# Patient Record
Sex: MALE | Race: BLACK OR AFRICAN AMERICAN | NOT HISPANIC OR LATINO | Employment: OTHER | URBAN - METROPOLITAN AREA
[De-identification: names, ages, dates, MRNs, and addresses within clinical notes are randomized per-mention and may not be internally consistent; named-entity substitution may affect disease eponyms.]

---

## 2024-01-02 ENCOUNTER — APPOINTMENT (OUTPATIENT)
Dept: PRIMARY CARE | Facility: CLINIC | Age: 74
End: 2024-01-02
Payer: MEDICARE

## 2024-01-05 ENCOUNTER — OFFICE VISIT (OUTPATIENT)
Dept: UROLOGY | Facility: CLINIC | Age: 74
End: 2024-01-05
Payer: MEDICARE

## 2024-01-05 VITALS — TEMPERATURE: 97.3 F

## 2024-01-05 DIAGNOSIS — N40.1 BPH WITH OBSTRUCTION/LOWER URINARY TRACT SYMPTOMS: Primary | ICD-10-CM

## 2024-01-05 DIAGNOSIS — N13.8 BPH WITH OBSTRUCTION/LOWER URINARY TRACT SYMPTOMS: Primary | ICD-10-CM

## 2024-01-05 DIAGNOSIS — N52.9 ERECTILE DYSFUNCTION, UNSPECIFIED ERECTILE DYSFUNCTION TYPE: ICD-10-CM

## 2024-01-05 PROCEDURE — 99214 OFFICE O/P EST MOD 30 MIN: CPT | Performed by: UROLOGY

## 2024-01-05 PROCEDURE — 1125F AMNT PAIN NOTED PAIN PRSNT: CPT | Performed by: UROLOGY

## 2024-01-05 PROCEDURE — 1159F MED LIST DOCD IN RCRD: CPT | Performed by: UROLOGY

## 2024-01-05 PROCEDURE — 51741 ELECTRO-UROFLOWMETRY FIRST: CPT | Performed by: UROLOGY

## 2024-01-05 PROCEDURE — 1036F TOBACCO NON-USER: CPT | Performed by: UROLOGY

## 2024-01-05 RX ORDER — TADALAFIL 20 MG/1
20 TABLET ORAL DAILY PRN
Qty: 30 TABLET | Refills: 6 | Status: SHIPPED | OUTPATIENT
Start: 2024-01-05 | End: 2025-01-04

## 2024-01-05 NOTE — PROGRESS NOTES
"Subjective   Claude E Yelder is a 73 y.o.  male with a history of erectile dysfunction and BPH s/p GreenLight PVP on 927/2021, presenting today for follow up visit. Patient reports no bothersome daytime urinary symptoms, his stream is excellent.  He has nocturia x3-4 with large volume. He does drink plenty of fluid before going to bed. Patient states that Sildenafil works well for ED, however he experiences heartburn when he takes the medication and is interested in trying an alternative. He denies flank pain, gross hematuria, kidney stones, and recurrent UTI.    Objective   Past Medical History:   Diagnosis Date    Personal history of other diseases of the musculoskeletal system and connective tissue 07/31/2018    History of radiculopathy     No past surgical history on file.  No current outpatient medications on file prior to visit.     No current facility-administered medications on file prior to visit.     No Known Allergies    Temp 36.3 °C (97.3 °F)   Physical Exam    Lab Review  No results found for: \"PSA\"  PVR 1ml      Assessment/Plan     Diagnoses and all orders for this visit:  Erectile dysfunction, unspecified erectile dysfunction type  -     tadalafil (Cialis) 20 mg tablet; Take 1 tablet (20 mg) by mouth once daily as needed for erectile dysfunction.  BPH with obstruction/lower urinary tract symptoms  -     Urine flow measurement    BPH with nocturia x3-4     We discussed behavioral modifications that can contribute to lessening irritative symptoms, namely evening fluid restriction, elevating one's legs for a while before bedtime, voiding right before bedtime, and avoiding bladder irritants like caffeine, carbonated beverages, dark-colored beverages, artificial sweeteners, acidic foods and beverages, and spicy foods.    We will continue to monitor and follow up in 1 year.     2. Erectile Dysfunction      We will start patient on Cialis 20mg.    The patient has been diagnosed with ED and cardiac assessment " according to the Wayne criteria allows use of oral PDE-5 inhibitor. The patient understands that these medications are not initiators of erection and that he will still require sexual stimulation. If prescribed vardenafil or sildenafil, he was advised to take the medication 30-60 minutes prior to sexual activity and that the efficacy of the medication is decreased following a high-fat meal.     The patient verbalized understanding that nitrates such as nitroglycerine, isosorbide mononitrate, isosorbide dinitrate and any other nitrate preparations are absolutely contradicted with the use of a PDE-5 inhibitor. The patient was advised to alert any medical personal of PDE- 5 inhibitor use should he seek urgent medical attention for any reason.     I explained the common adverse effects of therapy including but not limited to headache, flushing, dizziness, rash, upset stomach, diarrhea, nasal congestion, abnormal vision, back pain, and myalgia. Less common side effects are lightheadedness or blood pressure drop upon standing. We discussed that patients have  after using medications in this class, and sometimes the exact cause of death was not able to be determined. There is a very small risk of nonarteritic ischemic optic neuropathy, a rare cause of blindness that may be permanent while using medications in this class. Patient is instructed to immediately stop this medication and seek medical attention if he develops visual disturbances in one or both eyes.     We discussed that if he experiences erection lasting longer than four hours, he needs to seek immediate treatment at the ER as the longer he waits, the more damage is done to the penile tissue. The patient states that he is not withholding any information about his condition or the use of medications in order to receive a PDE5 inhibitor class medication. No barriers to learning were identified. After all of the patients' questions were satisfactorily  answered, he expressed understanding the risks of therapy and wishes to proceed.     All questions were answered to the patient's satisfaction. Patient agrees with the plan and wishes to proceed. Follow-up will be scheduled appropriately.     Scribed for Dr. Way by Kenna Rodriguez. I , Dr Way, have personally reviewed and agreed with the information entered by the Virtual Scribe.

## 2024-01-08 ENCOUNTER — APPOINTMENT (OUTPATIENT)
Dept: PRIMARY CARE | Facility: CLINIC | Age: 74
End: 2024-01-08
Payer: MEDICARE

## 2024-01-09 ENCOUNTER — OFFICE VISIT (OUTPATIENT)
Dept: PRIMARY CARE | Facility: CLINIC | Age: 74
End: 2024-01-09
Payer: MEDICARE

## 2024-01-09 VITALS
BODY MASS INDEX: 23.66 KG/M2 | WEIGHT: 169 LBS | DIASTOLIC BLOOD PRESSURE: 73 MMHG | SYSTOLIC BLOOD PRESSURE: 131 MMHG | HEIGHT: 71 IN | OXYGEN SATURATION: 97 % | HEART RATE: 70 BPM

## 2024-01-09 DIAGNOSIS — Z00.00 HEALTH CARE MAINTENANCE: ICD-10-CM

## 2024-01-09 DIAGNOSIS — I10 HYPERTENSION, UNSPECIFIED TYPE: ICD-10-CM

## 2024-01-09 DIAGNOSIS — K29.70 GASTRITIS WITHOUT BLEEDING, UNSPECIFIED CHRONICITY, UNSPECIFIED GASTRITIS TYPE: ICD-10-CM

## 2024-01-09 DIAGNOSIS — Z23 NEED FOR VACCINATION: ICD-10-CM

## 2024-01-09 DIAGNOSIS — Z00.00 ROUTINE GENERAL MEDICAL EXAMINATION AT HEALTH CARE FACILITY: Primary | ICD-10-CM

## 2024-01-09 DIAGNOSIS — Z12.5 SCREENING PSA (PROSTATE SPECIFIC ANTIGEN): ICD-10-CM

## 2024-01-09 PROBLEM — N13.8 BPH WITH OBSTRUCTION/LOWER URINARY TRACT SYMPTOMS: Status: ACTIVE | Noted: 2024-01-09

## 2024-01-09 PROBLEM — M54.16 ACUTE LUMBAR RADICULOPATHY: Status: ACTIVE | Noted: 2024-01-09

## 2024-01-09 PROBLEM — N40.1 BPH WITH OBSTRUCTION/LOWER URINARY TRACT SYMPTOMS: Status: ACTIVE | Noted: 2024-01-09

## 2024-01-09 LAB
ALBUMIN SERPL BCP-MCNC: 4.4 G/DL (ref 3.4–5)
ALP SERPL-CCNC: 104 U/L (ref 33–136)
ALT SERPL W P-5'-P-CCNC: 9 U/L (ref 10–52)
ANION GAP SERPL CALC-SCNC: 11 MMOL/L (ref 10–20)
AST SERPL W P-5'-P-CCNC: 15 U/L (ref 9–39)
BILIRUB SERPL-MCNC: 0.5 MG/DL (ref 0–1.2)
BUN SERPL-MCNC: 13 MG/DL (ref 6–23)
CALCIUM SERPL-MCNC: 10 MG/DL (ref 8.6–10.6)
CHLORIDE SERPL-SCNC: 105 MMOL/L (ref 98–107)
CHOLEST SERPL-MCNC: 186 MG/DL (ref 0–199)
CHOLESTEROL/HDL RATIO: 4.9
CO2 SERPL-SCNC: 31 MMOL/L (ref 21–32)
CREAT SERPL-MCNC: 1.18 MG/DL (ref 0.5–1.3)
EGFRCR SERPLBLD CKD-EPI 2021: 64 ML/MIN/1.73M*2
ERYTHROCYTE [DISTWIDTH] IN BLOOD BY AUTOMATED COUNT: 14.2 % (ref 11.5–14.5)
GLUCOSE SERPL-MCNC: 91 MG/DL (ref 74–99)
HCT VFR BLD AUTO: 45.2 % (ref 41–52)
HDLC SERPL-MCNC: 38 MG/DL
HGB BLD-MCNC: 14.8 G/DL (ref 13.5–17.5)
LDLC SERPL CALC-MCNC: 130 MG/DL
MCH RBC QN AUTO: 30 PG (ref 26–34)
MCHC RBC AUTO-ENTMCNC: 32.7 G/DL (ref 32–36)
MCV RBC AUTO: 92 FL (ref 80–100)
NON HDL CHOLESTEROL: 148 MG/DL (ref 0–149)
NRBC BLD-RTO: 0 /100 WBCS (ref 0–0)
PLATELET # BLD AUTO: 206 X10*3/UL (ref 150–450)
POTASSIUM SERPL-SCNC: 4.3 MMOL/L (ref 3.5–5.3)
PROT SERPL-MCNC: 7.2 G/DL (ref 6.4–8.2)
PSA SERPL-MCNC: 1.66 NG/ML
RBC # BLD AUTO: 4.94 X10*6/UL (ref 4.5–5.9)
SODIUM SERPL-SCNC: 143 MMOL/L (ref 136–145)
TRIGL SERPL-MCNC: 90 MG/DL (ref 0–149)
TSH SERPL-ACNC: 3.1 MIU/L (ref 0.44–3.98)
VLDL: 18 MG/DL (ref 0–40)
WBC # BLD AUTO: 9.5 X10*3/UL (ref 4.4–11.3)

## 2024-01-09 PROCEDURE — 3078F DIAST BP <80 MM HG: CPT | Performed by: INTERNAL MEDICINE

## 2024-01-09 PROCEDURE — 1036F TOBACCO NON-USER: CPT | Performed by: INTERNAL MEDICINE

## 2024-01-09 PROCEDURE — 80053 COMPREHEN METABOLIC PANEL: CPT

## 2024-01-09 PROCEDURE — 85027 COMPLETE CBC AUTOMATED: CPT

## 2024-01-09 PROCEDURE — 99397 PER PM REEVAL EST PAT 65+ YR: CPT | Performed by: INTERNAL MEDICINE

## 2024-01-09 PROCEDURE — 1125F AMNT PAIN NOTED PAIN PRSNT: CPT | Performed by: INTERNAL MEDICINE

## 2024-01-09 PROCEDURE — 3075F SYST BP GE 130 - 139MM HG: CPT | Performed by: INTERNAL MEDICINE

## 2024-01-09 PROCEDURE — 1160F RVW MEDS BY RX/DR IN RCRD: CPT | Performed by: INTERNAL MEDICINE

## 2024-01-09 PROCEDURE — 1170F FXNL STATUS ASSESSED: CPT | Performed by: INTERNAL MEDICINE

## 2024-01-09 PROCEDURE — G0439 PPPS, SUBSEQ VISIT: HCPCS | Performed by: INTERNAL MEDICINE

## 2024-01-09 PROCEDURE — G0103 PSA SCREENING: HCPCS

## 2024-01-09 PROCEDURE — 90677 PCV20 VACCINE IM: CPT | Performed by: INTERNAL MEDICINE

## 2024-01-09 PROCEDURE — 84443 ASSAY THYROID STIM HORMONE: CPT

## 2024-01-09 PROCEDURE — 1159F MED LIST DOCD IN RCRD: CPT | Performed by: INTERNAL MEDICINE

## 2024-01-09 PROCEDURE — 36415 COLL VENOUS BLD VENIPUNCTURE: CPT

## 2024-01-09 PROCEDURE — 80061 LIPID PANEL: CPT

## 2024-01-09 PROCEDURE — G0009 ADMIN PNEUMOCOCCAL VACCINE: HCPCS | Performed by: INTERNAL MEDICINE

## 2024-01-09 RX ORDER — OMEPRAZOLE 40 MG/1
40 CAPSULE, DELAYED RELEASE ORAL
Qty: 30 CAPSULE | Refills: 1 | Status: SHIPPED | OUTPATIENT
Start: 2024-01-09 | End: 2024-03-09

## 2024-01-09 ASSESSMENT — ACTIVITIES OF DAILY LIVING (ADL)
GROCERY_SHOPPING: INDEPENDENT
DRESSING: INDEPENDENT
MANAGING_FINANCES: INDEPENDENT
TAKING_MEDICATION: INDEPENDENT
DOING_HOUSEWORK: INDEPENDENT
BATHING: INDEPENDENT

## 2024-01-09 ASSESSMENT — PATIENT HEALTH QUESTIONNAIRE - PHQ9
SUM OF ALL RESPONSES TO PHQ9 QUESTIONS 1 AND 2: 0
1. LITTLE INTEREST OR PLEASURE IN DOING THINGS: NOT AT ALL
2. FEELING DOWN, DEPRESSED OR HOPELESS: NOT AT ALL

## 2024-01-09 NOTE — PROGRESS NOTES
"Subjective   Patient ID: Claude E Yelder is a 73 y.o. male who presents for Establish Care and Heartburn.    Patient Care Team:  Hadley Moses DO as PCP - General (Family Medicine)       HPI     Patient is a 43-year-old male with no significant past medical history presents for Medicare wellness  Patient is up-to-date on his colonoscopy.  He is due for laboratory screening.  Needs pneumonia vaccine today.  He has been complaining of epigastric pain it has been going on for the last month.  He has not tried any medications as of yet no blood in the stool no recent weight loss in fact he has gained some weight.  He does not take any medications and his blood pressure is well-controlled.      He lives alone at home.  He has 2 children who are grown.  Denies illicit substances smoking or alcohol    Review of Systems  Constitutional: No fever or chills, No Night Sweats  Eyes: No Blurry Vision or Eye sight problems  ENT: No Nasal Discharge, Hoarseness, sore throat  Cardiovascular: no chest pain, no palpitations and no syncope.   Respiratory: no cough, no shortness of breath during exertion and no shortness of breath at rest.   Gastrointestinal: no abdominal pain, no nausea and no vomiting.   : No issues with urinary stream, burning with urination, no blood in urine or stools  Skin: No Skin rashes or Lesions  Neuro: No Headache, no dizziness or Numbness or tingling  Psych: No Anxiety, depression or sleeping problems  Heme: No Easy bleeding or brusing.     Objective   /73   Pulse 70   Ht 1.803 m (5' 11\")   Wt 76.7 kg (169 lb)   SpO2 97%   BMI 23.57 kg/m²     Physical Exam  Constitutional: Alert and in no acute distress. Well developed, well nourished.   Head and Face: Head and face: Normal.    Eyes: Normal external exam. Pupils were equal in size, round, reactive to light (PERRL) with normal accommodation and extraocular movements intact (EOMI).   Ears, Nose, Mouth, and Throat: External inspection of ears and " nose: Normal.  Hearing: Normal.  Nasal mucosa, septum, and turbinates: Normal.  Lips, teeth, and gums: Normal.  Oropharynx: Normal.   Neck: No neck mass was observed. Supple. Thyroid not enlarged and there were no palpable thyroid nodules.   Cardiovascular: Heart rate and rhythm were normal, normal S1 and S2. Pedal pulses: Normal. No peripheral edema.   Pulmonary: No respiratory distress. Clear bilateral breath sounds.   Abdomen: Soft nontender; no abdominal mass palpated. Normal bowel sounds. No organomegaly.   : Deferred  Musculoskeletal: No joint swelling seen, normal movements of all extremities. Range of motion: Normal.  Muscle strength/tone: Normal.    Skin: Normal skin color and pigmentation, normal skin turgor, and no rash.   Neurologic: Deep tendon reflexes were 2+ and symmetric.   Psychiatric: Judgment and insight: Intact. Mood and affect: Normal.  Lymphatic: No cervical lymphadenopathy. Palpation of lymph nodes in axillae: Normal.  Palpation of lymph nodes in groin: Normal.    Lab Results   Component Value Date    WBC 7.1 09/22/2021    HGB 14.2 09/22/2021    HCT 43.0 09/22/2021     09/22/2021    CHOL 172 05/17/2021    TRIG 65 05/17/2021    HDL 38.2 (A) 05/17/2021    ALT 14 05/17/2021    AST 14 05/17/2021     09/22/2021    K 4.2 09/22/2021     09/22/2021    CREATININE 1.35 (H) 09/22/2021    BUN 8 09/22/2021    CO2 31 09/22/2021    INR 1.1 09/22/2021       Electrocardiogram 12 Lead  Normal sinus rhythm  Normal ECG  No previous ECGs available  Confirmed by ARIES CHOI MD [id number]) on 8/3/2020 7:49:49 AM  Electrocardiogram 12 Lead  Normal sinus rhythm  Normal ECG  When compared with ECG of 06-JUN-2020 13:44,  No significant change was found  Please see ED Provider Note for formal interpretation  Confirmed by KAMILA SERRANO MD ([id number]) on 1/22/2021 6:25:44 PM  Electrocardiogram 12 Lead  Please see ED Provider Note for formal interpretation  Confirmed by ZACH  KAMILA BECERRA ([id number]) on 1/22/2021 6:27:16 PM  Electrocardiogram 12 Lead  Normal sinus rhythm  Normal ECG  When compared with ECG of 15-YARELY-2021 16:58,  Previous ECG has undetermined rhythm, needs review  Confirmed by IVIS GAITAN MD [id number]) on 3/30/2021 8:03:02 PM      Assessment/Plan   Problem List Items Addressed This Visit    None  Visit Diagnoses         Codes    Routine general medical examination at health care facility    -  Primary Z00.00    Relevant Orders    1 Year Follow Up In [facility] - PCP - Wellness Exam    Health care maintenance     Z00.00    Relevant Orders    CBC    Comprehensive metabolic panel    Lipid Panel    Prostate Spec.Ag,Screen    TSH with reflex to Free T4 if abnormal    Screening PSA (prostate specific antigen)     Z12.5    Relevant Orders    Prostate Spec.Ag,Screen    Need for vaccination     Z23    Relevant Orders    Pneumococcal conjugate vaccine 20-valent IM    Gastritis without bleeding, unspecified chronicity, unspecified gastritis type     K29.70    Relevant Medications    omeprazole (PriLOSEC) 40 mg DR capsule    Hypertension, unspecified type     I10    Relevant Orders    CBC              Dear Claude E Yelder     It was my pleasure to take care of you today in the office. Below are the things we discussed today:    1. Immunizations: Yearly Flu shot is recommended.   Deferring vaccines but will allow for pneumonia 20    2. Blood Work: Ordered  3. Seen your dentist twice a year  4. Yearly Eye exam is recommended    5. BMI: 23.6  6: Diet recommendations:   Eat Clean, Try to have as many home cooked meals as possible  Avoid processed foods which contain excess calories, sugar, and sodium.    7. Exercise recommendations:   150 minutes a week to maintain your weight     If you have to loose weight, you need a better diet and exercise plan.     8. Please get your Living will / Advance directive completed if you do not have one already. Please make sure our office has  a copy of the latest one.     9. Colonoscopy: Uptodate  10. PSA: Ordered    11.     Follow up in one year for a Physical. Please call the office before your Physical to see if you need blood work completed prior to your physical.     Please call me if any questions arise from now until your next visit. I will call you after I am done seeing patients. A Doctor is always available by phone when the office is closed. Please feel free to call for help with any problem that you feel shouldn't wait until the office re-opens.     Shravan Rodriguez, DO

## 2024-05-28 ENCOUNTER — LAB (OUTPATIENT)
Dept: LAB | Facility: LAB | Age: 74
End: 2024-05-28
Payer: MEDICARE

## 2024-05-28 ENCOUNTER — OFFICE VISIT (OUTPATIENT)
Dept: PRIMARY CARE | Facility: CLINIC | Age: 74
End: 2024-05-28
Payer: MEDICARE

## 2024-05-28 VITALS
BODY MASS INDEX: 23.71 KG/M2 | OXYGEN SATURATION: 96 % | SYSTOLIC BLOOD PRESSURE: 125 MMHG | WEIGHT: 170 LBS | DIASTOLIC BLOOD PRESSURE: 76 MMHG | HEART RATE: 73 BPM

## 2024-05-28 DIAGNOSIS — R42 DIZZINESS: Primary | ICD-10-CM

## 2024-05-28 DIAGNOSIS — I10 HYPERTENSION, UNSPECIFIED TYPE: ICD-10-CM

## 2024-05-28 DIAGNOSIS — R42 DIZZINESS: ICD-10-CM

## 2024-05-28 PROBLEM — H81.10 BPPV (BENIGN PAROXYSMAL POSITIONAL VERTIGO): Status: ACTIVE | Noted: 2024-05-28

## 2024-05-28 LAB
ALBUMIN SERPL BCP-MCNC: 4.4 G/DL (ref 3.4–5)
ALP SERPL-CCNC: 106 U/L (ref 33–136)
ALT SERPL W P-5'-P-CCNC: 9 U/L (ref 10–52)
ANION GAP SERPL CALC-SCNC: 13 MMOL/L (ref 10–20)
AST SERPL W P-5'-P-CCNC: 13 U/L (ref 9–39)
BILIRUB SERPL-MCNC: 0.4 MG/DL (ref 0–1.2)
BUN SERPL-MCNC: 14 MG/DL (ref 6–23)
CALCIUM SERPL-MCNC: 9.9 MG/DL (ref 8.6–10.6)
CHLORIDE SERPL-SCNC: 105 MMOL/L (ref 98–107)
CO2 SERPL-SCNC: 29 MMOL/L (ref 21–32)
CREAT SERPL-MCNC: 1.19 MG/DL (ref 0.5–1.3)
EGFRCR SERPLBLD CKD-EPI 2021: 64 ML/MIN/1.73M*2
ERYTHROCYTE [DISTWIDTH] IN BLOOD BY AUTOMATED COUNT: 14.1 % (ref 11.5–14.5)
GLUCOSE SERPL-MCNC: 84 MG/DL (ref 74–99)
HCT VFR BLD AUTO: 47.3 % (ref 41–52)
HGB BLD-MCNC: 14.9 G/DL (ref 13.5–17.5)
MCH RBC QN AUTO: 28.9 PG (ref 26–34)
MCHC RBC AUTO-ENTMCNC: 31.5 G/DL (ref 32–36)
MCV RBC AUTO: 92 FL (ref 80–100)
NRBC BLD-RTO: 0 /100 WBCS (ref 0–0)
PLATELET # BLD AUTO: 200 X10*3/UL (ref 150–450)
POTASSIUM SERPL-SCNC: 4 MMOL/L (ref 3.5–5.3)
PROT SERPL-MCNC: 7.2 G/DL (ref 6.4–8.2)
RBC # BLD AUTO: 5.15 X10*6/UL (ref 4.5–5.9)
SODIUM SERPL-SCNC: 143 MMOL/L (ref 136–145)
TSH SERPL-ACNC: 1.71 MIU/L (ref 0.44–3.98)
WBC # BLD AUTO: 7 X10*3/UL (ref 4.4–11.3)

## 2024-05-28 PROCEDURE — 3078F DIAST BP <80 MM HG: CPT | Performed by: INTERNAL MEDICINE

## 2024-05-28 PROCEDURE — 3074F SYST BP LT 130 MM HG: CPT | Performed by: INTERNAL MEDICINE

## 2024-05-28 PROCEDURE — 1036F TOBACCO NON-USER: CPT | Performed by: INTERNAL MEDICINE

## 2024-05-28 PROCEDURE — 1159F MED LIST DOCD IN RCRD: CPT | Performed by: INTERNAL MEDICINE

## 2024-05-28 PROCEDURE — 36415 COLL VENOUS BLD VENIPUNCTURE: CPT

## 2024-05-28 PROCEDURE — 85027 COMPLETE CBC AUTOMATED: CPT

## 2024-05-28 PROCEDURE — 84443 ASSAY THYROID STIM HORMONE: CPT

## 2024-05-28 PROCEDURE — 99214 OFFICE O/P EST MOD 30 MIN: CPT | Performed by: INTERNAL MEDICINE

## 2024-05-28 PROCEDURE — 93000 ELECTROCARDIOGRAM COMPLETE: CPT | Performed by: INTERNAL MEDICINE

## 2024-05-28 PROCEDURE — 1123F ACP DISCUSS/DSCN MKR DOCD: CPT | Performed by: INTERNAL MEDICINE

## 2024-05-28 PROCEDURE — 1160F RVW MEDS BY RX/DR IN RCRD: CPT | Performed by: INTERNAL MEDICINE

## 2024-05-28 PROCEDURE — 80053 COMPREHEN METABOLIC PANEL: CPT

## 2024-05-28 RX ORDER — NAPROXEN SODIUM 220 MG
220 TABLET ORAL EVERY 12 HOURS
COMMUNITY

## 2024-05-28 RX ORDER — FLUOXETINE HYDROCHLORIDE 20 MG/1
20 CAPSULE ORAL DAILY
COMMUNITY
Start: 2021-04-01

## 2024-05-28 NOTE — PROGRESS NOTES
Subjective   Patient ID: Claude E Yelder is a 73 y.o. male who presents for Dizziness and Shoulder Pain.    HPI     Patient is a 73-year-old male with past medical history of BPH and lumbar radiculopathy who presents with chief complaint of ongoing dizziness when he stands up from a seated position.  Currently not experiencing any dizziness.  No shortness of breath.  No headaches.  No changes in vision.  He is not currently on any BPH medications or blood pressure medications although he does take Prozac once daily and follows with psychiatry.  No alleviating factors .  Symptoms occur when he gets up from a seated position and last for a brief period of time    Review of Systems  Constitutional: No fever or chills  Cardiovascular: no chest pain, no palpitations and no syncope.   Respiratory: no cough, no shortness of breath during exertion and no shortness of breath at rest.   Gastrointestinal: no abdominal pain, no nausea and no vomiting.  Neuro: No Headache, no dizziness    Objective   /76   Pulse 73   Wt 77.1 kg (170 lb)   SpO2 96%   BMI 23.71 kg/m²     Physical Exam  Constitutional: Alert and in no acute distress. Well developed, well nourished  Head and Face: Head and face: Normal.    Cardiovascular: Heart rate and rhythm were normal, normal S1 and S2. No peripheral edema.   Pulmonary: No respiratory distress. Clear bilateral breath sounds.  Musculoskeletal: Gait and station: Normal. Muscle strength/tone: Normal.   Skin: Normal skin color and pigmentation, normal skin turgor, and no rash.    Psychiatric: Judgment and insight: Intact. Mood and affect: Normal.    Procedures    Lab Results   Component Value Date    WBC 9.5 01/09/2024    HGB 14.8 01/09/2024    HCT 45.2 01/09/2024     01/09/2024    CHOL 186 01/09/2024    TRIG 90 01/09/2024    HDL 38.0 01/09/2024    ALT 9 (L) 01/09/2024    AST 15 01/09/2024     01/09/2024    K 4.3 01/09/2024     01/09/2024    CREATININE 1.18 01/09/2024     BUN 13 01/09/2024    CO2 31 01/09/2024    TSH 3.10 01/09/2024    INR 1.1 09/22/2021       Electrocardiogram 12 Lead  Normal sinus rhythm  Normal ECG  No previous ECGs available  Confirmed by ARIES CHOI MD ([id number]) on 8/3/2020 7:49:49 AM  Electrocardiogram 12 Lead  Normal sinus rhythm  Normal ECG  When compared with ECG of 06-JUN-2020 13:44,  No significant change was found  Please see ED Provider Note for formal interpretation  Confirmed by KAMILA SERRANO MD ([id number]) on 1/22/2021 6:25:44 PM  Electrocardiogram 12 Lead  Please see ED Provider Note for formal interpretation  Confirmed by KAMILA SERRANO MD ([id number]) on 1/22/2021 6:27:16 PM  Electrocardiogram 12 Lead  Normal sinus rhythm  Normal ECG  When compared with ECG of 15-YARELY-2021 16:58,  Previous ECG has undetermined rhythm, needs review  Confirmed by IVIS GAITAN MD ([id number]) on 3/30/2021 8:03:02 PM            Assessment/Plan

## 2024-05-28 NOTE — ASSESSMENT & PLAN NOTE
Probable positional vertigo.  Orthostatics were negative.  EKG showed normal sinus rhythm without significant abnormalities or arrhythmias.  Referral to vestibular therapy.  Check CT head.  Will check metabolic laboratory tests to rule out metabolic causes such as low sodium, kidney impairment and thyroid.  Follow-up 1 month for reevaluation    Orthostatic blood pressures include resting 157/77 with a heart rate of 60.  Sitting of 161/88 with a heart rate of 60.  Standing with a blood pressure of 158/98.

## 2024-06-26 ENCOUNTER — APPOINTMENT (OUTPATIENT)
Dept: PRIMARY CARE | Facility: CLINIC | Age: 74
End: 2024-06-26
Payer: MEDICARE

## 2024-06-26 VITALS
WEIGHT: 173 LBS | BODY MASS INDEX: 24.13 KG/M2 | OXYGEN SATURATION: 96 % | DIASTOLIC BLOOD PRESSURE: 76 MMHG | HEART RATE: 61 BPM | SYSTOLIC BLOOD PRESSURE: 161 MMHG

## 2024-06-26 DIAGNOSIS — M79.10 MUSCLE PAIN: Primary | ICD-10-CM

## 2024-06-26 DIAGNOSIS — R42 DIZZINESS: ICD-10-CM

## 2024-06-26 PROCEDURE — 1159F MED LIST DOCD IN RCRD: CPT | Performed by: INTERNAL MEDICINE

## 2024-06-26 PROCEDURE — 1123F ACP DISCUSS/DSCN MKR DOCD: CPT | Performed by: INTERNAL MEDICINE

## 2024-06-26 PROCEDURE — 99213 OFFICE O/P EST LOW 20 MIN: CPT | Performed by: INTERNAL MEDICINE

## 2024-06-26 PROCEDURE — 1036F TOBACCO NON-USER: CPT | Performed by: INTERNAL MEDICINE

## 2024-06-26 PROCEDURE — 1160F RVW MEDS BY RX/DR IN RCRD: CPT | Performed by: INTERNAL MEDICINE

## 2024-06-26 RX ORDER — METHOCARBAMOL 500 MG/1
500 TABLET, FILM COATED ORAL 3 TIMES DAILY PRN
Qty: 21 TABLET | Refills: 0 | Status: SHIPPED | OUTPATIENT
Start: 2024-06-26 | End: 2024-07-03

## 2024-06-26 NOTE — PROGRESS NOTES
Subjective   Patient ID: Claude E Yelder is a 73 y.o. male who presents for Follow-up and Hip Pain (Patient complaining of right sided hip pain radiating down rt leg).    HPI     Patient is a 73-year-old male who presents with chief complaint of right lower extremity pain.  Mostly affecting the greater trochanter on the right and radiating towards the knee.  It began after he was walking.  There is a time while in [place] at night for falls.  No alleviating factors.  Has not been using Tylenol or ibuprofen    Review of Systems  Constitutional: No fever or chills  Cardiovascular: no chest pain, no palpitations and no syncope.   Respiratory: no cough, no shortness of breath during exertion and no shortness of breath at rest.   Gastrointestinal: no abdominal pain, no nausea and no vomiting.  Neuro: No Headache, no dizziness    Objective   /76   Pulse 61   Wt 78.5 kg (173 lb)   SpO2 96%   BMI 24.13 kg/m²     Physical Exam  Constitutional: Alert and in no acute distress. Well developed, well nourished  Head and Face: Head and face: Normal.    Cardiovascular: Heart rate and rhythm were normal, normal S1 and S2. No peripheral edema.   Pulmonary: No respiratory distress. Clear bilateral breath sounds.  Musculoskeletal: Pain on the right worse with lifting the hip,   Skin: Normal skin color and pigmentation, normal skin turgor, and no rash.    Psychiatric: Judgment and insight: Intact. Mood and affect: Normal.    Procedures    Lab Results   Component Value Date    WBC 7.0 05/28/2024    HGB 14.9 05/28/2024    HCT 47.3 05/28/2024     05/28/2024    CHOL 186 01/09/2024    TRIG 90 01/09/2024    HDL 38.0 01/09/2024    ALT 9 (L) 05/28/2024    AST 13 05/28/2024     05/28/2024    K 4.0 05/28/2024     05/28/2024    CREATININE 1.19 05/28/2024    BUN 14 05/28/2024    CO2 29 05/28/2024    TSH 1.71 05/28/2024    INR 1.1 09/22/2021       ECG 12 lead (Clinic Performed)  Nsr without  arrythmia            Assessment/Plan   Problem List Items Addressed This Visit    None  Visit Diagnoses         Codes    Muscle pain    -  Primary M79.10    Relevant Medications    methocarbamol (Robaxin) 500 mg tablet    Dizziness     R42        patient with musculoskeletal pain likely due to overuse injury while at negative falls.  Will provide methocarbamol as needed for pain.  Recommend ibuprofen 400 mg twice daily as needed.  Encourage stretches and exercise.  Follow-up in 2 to 3 weeks if no improvement    Blood pressure likely elevated due to the pain.  In 2 weeks please recheck your blood pressure if it still above 140 please return to clinic for blood pressure recheck

## 2024-07-01 ENCOUNTER — APPOINTMENT (OUTPATIENT)
Dept: UROLOGY | Facility: CLINIC | Age: 74
End: 2024-07-01
Payer: MEDICARE

## 2024-07-01 DIAGNOSIS — N40.1 BPH WITH OBSTRUCTION/LOWER URINARY TRACT SYMPTOMS: ICD-10-CM

## 2024-07-01 DIAGNOSIS — N13.8 BPH WITH OBSTRUCTION/LOWER URINARY TRACT SYMPTOMS: ICD-10-CM

## 2024-07-01 DIAGNOSIS — N52.9 ERECTILE DYSFUNCTION, UNSPECIFIED ERECTILE DYSFUNCTION TYPE: Primary | ICD-10-CM

## 2024-07-01 PROCEDURE — 99214 OFFICE O/P EST MOD 30 MIN: CPT | Performed by: UROLOGY

## 2024-07-01 PROCEDURE — G2211 COMPLEX E/M VISIT ADD ON: HCPCS | Performed by: UROLOGY

## 2024-07-01 PROCEDURE — 1123F ACP DISCUSS/DSCN MKR DOCD: CPT | Performed by: UROLOGY

## 2024-07-01 RX ORDER — SILDENAFIL 100 MG/1
100 TABLET, FILM COATED ORAL AS NEEDED
Qty: 30 TABLET | Refills: 6 | Status: SHIPPED | OUTPATIENT
Start: 2024-07-01 | End: 2025-07-01

## 2024-07-01 NOTE — PROGRESS NOTES
Scribed for Dr. Ashley Way by Dominga Davis I, Dr. Ashley Way, have personally reviewed and agreed with the information entered by the Virtual Scribe. 07/01/24  This visit was completed via telemedicine. All issues as below were discussed and addressed but no physical exam was performed unless allowed by visual confirmation. If it was felt that the patient should be evaluated in clinic, then they were directed there. Patient verbal consented to the visit.    History of Present Illness (HPI):  TODAY (07/01/24)  Claude E Yelder is a 73 y.o. male presenting virtually with a history of erectile dysfunction and BPH s/p GreenLight PVP on 927/2021, ED managed with medication.     He reports that he would like to go back on the sildenafil as it was much more effective than the tadalafil we switched him to last visit.     Past Medical History:   Diagnosis Date    Personal history of other diseases of the musculoskeletal system and connective tissue 07/31/2018    History of radiculopathy     No past surgical history on file.  No family history on file.  Social History     Tobacco Use   Smoking Status Former    Types: Cigarettes   Smokeless Tobacco Never     Current Outpatient Medications   Medication Sig Dispense Refill    FLUoxetine (PROzac) 20 mg capsule Take 1 capsule (20 mg) by mouth once daily.      methocarbamol (Robaxin) 500 mg tablet Take 1 tablet (500 mg) by mouth 3 times a day as needed for muscle spasms for up to 7 days. 21 tablet 0    naproxen sodium (Aleve) 220 mg tablet Take 1 tablet (220 mg) by mouth every 12 hours.      omeprazole (PriLOSEC) 40 mg DR capsule Take 1 capsule (40 mg) by mouth once daily in the morning. Take before meals. Do not crush or chew. 30 capsule 1     No current facility-administered medications for this visit.     No Known Allergies  Past medical, surgical, family and social history in the chart was reviewed and is accurate including any additions to what is in this HPI.    Review  "of systems (ROS):   Pertinent information as listed in the HPI.     Objective   There were no vitals taken for this visit.  PHYSICAL EXAM:  Exam limited 2/2 telehealth visit.     Lab Review  Lab Results   Component Value Date    WBC 7.0 05/28/2024    RBC 5.15 05/28/2024    HGB 14.9 05/28/2024    HCT 47.3 05/28/2024     05/28/2024      Lab Results   Component Value Date    BUN 14 05/28/2024    CREATININE 1.19 05/28/2024      No results found for: \"PSA\", \"HGBA1C\"    Lab Results   Component Value Date    CHOL 186 01/09/2024    TRIG 90 01/09/2024    HDL 38.0 01/09/2024    ALT 9 (L) 05/28/2024    AST 13 05/28/2024     05/28/2024    K 4.0 05/28/2024     05/28/2024    CO2 29 05/28/2024    TSH 1.71 05/28/2024    INR 1.1 09/22/2021     ASSESSMENT:  Problem List Items Addressed This Visit       BPH with obstruction/lower urinary tract symptoms     Other Visit Diagnoses       Erectile dysfunction, unspecified erectile dysfunction type    -  Primary           PLAN:  BPH with LUTs stable  ED    Requested to change medications back to the Sildenafil 100 mg. This was sent to his pharmacy.    All questions were answered to the patient’s satisfaction.  Patient agrees with the plan and wishes to proceed.  Follow-up for ongoing care of his chronic medical conditions.  Follow-up will be scheduled appropriately.     Scribed for Dr. Ashley Way by Dominga Davis I, Dr. Ashley Way, have personally reviewed and agreed with the information entered by the Virtual Scribe. 07/01/24    "

## 2024-09-04 ENCOUNTER — OFFICE VISIT (OUTPATIENT)
Dept: ORTHOPEDIC SURGERY | Facility: CLINIC | Age: 74
End: 2024-09-04
Payer: MEDICARE

## 2024-09-04 ENCOUNTER — HOSPITAL ENCOUNTER (OUTPATIENT)
Dept: RADIOLOGY | Facility: CLINIC | Age: 74
Discharge: HOME | End: 2024-09-04
Payer: MEDICARE

## 2024-09-04 DIAGNOSIS — M54.16 ACUTE LUMBAR RADICULOPATHY: ICD-10-CM

## 2024-09-04 PROCEDURE — 1123F ACP DISCUSS/DSCN MKR DOCD: CPT

## 2024-09-04 PROCEDURE — 73502 X-RAY EXAM HIP UNI 2-3 VIEWS: CPT | Mod: RT

## 2024-09-04 PROCEDURE — 73502 X-RAY EXAM HIP UNI 2-3 VIEWS: CPT | Mod: RIGHT SIDE | Performed by: RADIOLOGY

## 2024-09-04 PROCEDURE — 99204 OFFICE O/P NEW MOD 45 MIN: CPT

## 2024-09-04 RX ORDER — METHYLPREDNISOLONE 4 MG/1
4 TABLET ORAL ONCE
Qty: 21 TABLET | Refills: 0 | Status: SHIPPED | OUTPATIENT
Start: 2024-09-04 | End: 2024-09-04

## 2024-09-04 NOTE — PROGRESS NOTES
Claude E Yelder  is a 73 y.o. year-old  male. he  is a new patient to our office and presents with a chief complaint of Right  hip pain.  The pain started 1 week ago. The pain's location is glut area and radiates posteriorly down the leg into the foot. Denies groin pain. He does endorse low back pain with numbness/tingling radiating down the leg into the foot. Denies red flag symptoms to include loss of bowel or bladder control. He notes a hx of low back pain which he has received corticosteroid injections in the past. The symptoms have been treated with rest and activity modification. NSAIDS including [IBU/Naproxen] have also been used with minimal improvement. They [have not] attempted physical therapy. They [have not] had a corticosteroid injection. They [have not] had a previous hip surgery.     Past Medical, Family, and Social History reviewed and inlcude:[none] all other history pertinent to the presenting problem  Review of Systems  A complete review of systems was conducted, pertinent only to the HPI noted above.  Constitutional: None  Eyes: No additions to above history  Ears, Nose, Throat: No additions to above history  Cardiovascular: No additions to above history  Respiratory: No additions to above history  GI: No additions to above history  : No additions to above history  Skin/Neuro: No additions to above history  Endocrine/Heme/Lymph: No additions to above history  Immunologic: No additions to above history  Psychiatric: No additions to above history  Musculoskeletal: see above  Eyes: sclera anicteric  ENT: hearing appropriate for normal conversation, neck appears symmetric with no gross thyromegaly  Pulm: No labored breathing, no wheezing  CVS: Regular rate and rhythm  Abd: Non-distended  Skin: No rashes, erythema, or induration around hip    MUSCULOSKELETAL EXAM: HIP      Right HIP:   IR@90: [40] degrees   ER@90: [70] degrees   No pain in groin with FADIR , no Pain with palpation over GT-  reproduces pain, -stinchfield, - subspine, pain and weakness with resisted hip abduction. Positive straight leg raise.     Neurovascularly Intact to Femroal/obturator/LFCN/S/S/SPN/DPN/T nerves on bilateral extremities    Xrays independently interpreted and  reviewed: femoral head well seated within acetabulum. Very mild DJD of the FA joint. Degenerative changes to the lumbar region. No fracture or dislocation.     1. Acute lumbar radiculopathy  XR hip right with pelvis when performed 2 or 3 views           PLAN:  Radiographs discussed with patient. Given his symptoms and clinical exam findings, these are more so consistent with a spinal pathology rather than a true hip pathology. I would like to get him set up with one of our spine providers for further follow up care. He was provided their office contact information. Offered to send him a medrol dose pack in the meantime, he would like to try this, this was sent. All questions answered, patient in agreement with plan.

## 2024-09-07 ENCOUNTER — APPOINTMENT (OUTPATIENT)
Dept: RADIOLOGY | Facility: HOSPITAL | Age: 74
End: 2024-09-07
Payer: MEDICARE

## 2024-09-07 ENCOUNTER — HOSPITAL ENCOUNTER (EMERGENCY)
Facility: HOSPITAL | Age: 74
Discharge: HOME | End: 2024-09-07
Attending: EMERGENCY MEDICINE
Payer: MEDICARE

## 2024-09-07 VITALS
HEIGHT: 71 IN | WEIGHT: 170 LBS | RESPIRATION RATE: 18 BRPM | HEART RATE: 57 BPM | OXYGEN SATURATION: 98 % | TEMPERATURE: 97.6 F | DIASTOLIC BLOOD PRESSURE: 86 MMHG | SYSTOLIC BLOOD PRESSURE: 210 MMHG | BODY MASS INDEX: 23.8 KG/M2

## 2024-09-07 DIAGNOSIS — M54.31 RIGHT SIDED SCIATICA: Primary | ICD-10-CM

## 2024-09-07 PROBLEM — H40.009 GLAUCOMA SUSPECT: Status: ACTIVE | Noted: 2024-09-07

## 2024-09-07 PROBLEM — H02.409 ACQUIRED PTOSIS OF EYELID: Status: ACTIVE | Noted: 2021-10-19

## 2024-09-07 PROBLEM — N41.9 PROSTATITIS: Status: ACTIVE | Noted: 2024-09-07

## 2024-09-07 PROBLEM — H04.129 TEAR FILM INSUFFICIENCY: Status: ACTIVE | Noted: 2024-09-07

## 2024-09-07 PROBLEM — N52.9 ERECTILE DYSFUNCTION: Status: ACTIVE | Noted: 2024-09-07

## 2024-09-07 PROBLEM — H15.122: Status: ACTIVE | Noted: 2024-09-07

## 2024-09-07 PROBLEM — H25.10 NUCLEAR SCLEROTIC CATARACT: Status: ACTIVE | Noted: 2024-09-07

## 2024-09-07 PROCEDURE — 2500000001 HC RX 250 WO HCPCS SELF ADMINISTERED DRUGS (ALT 637 FOR MEDICARE OP): Performed by: EMERGENCY MEDICINE

## 2024-09-07 PROCEDURE — 99284 EMERGENCY DEPT VISIT MOD MDM: CPT | Mod: 25

## 2024-09-07 PROCEDURE — 2500000004 HC RX 250 GENERAL PHARMACY W/ HCPCS (ALT 636 FOR OP/ED): Performed by: PHYSICIAN ASSISTANT

## 2024-09-07 PROCEDURE — 93971 EXTREMITY STUDY: CPT

## 2024-09-07 PROCEDURE — 2500000001 HC RX 250 WO HCPCS SELF ADMINISTERED DRUGS (ALT 637 FOR MEDICARE OP): Performed by: PHYSICIAN ASSISTANT

## 2024-09-07 PROCEDURE — 93971 EXTREMITY STUDY: CPT | Performed by: RADIOLOGY

## 2024-09-07 PROCEDURE — 96372 THER/PROPH/DIAG INJ SC/IM: CPT | Performed by: PHYSICIAN ASSISTANT

## 2024-09-07 RX ORDER — BACLOFEN 10 MG/1
10 TABLET ORAL 3 TIMES DAILY
Qty: 15 TABLET | Refills: 0 | Status: SHIPPED | OUTPATIENT
Start: 2024-09-07 | End: 2024-09-12

## 2024-09-07 RX ORDER — TRAMADOL HYDROCHLORIDE 50 MG/1
50 TABLET ORAL EVERY 6 HOURS PRN
Qty: 15 TABLET | Refills: 0 | Status: SHIPPED | OUTPATIENT
Start: 2024-09-07 | End: 2024-09-11

## 2024-09-07 RX ORDER — DIAZEPAM 5 MG/1
5 TABLET ORAL NIGHTLY PRN
Qty: 5 TABLET | Refills: 0 | Status: SHIPPED | OUTPATIENT
Start: 2024-09-07 | End: 2024-09-07

## 2024-09-07 RX ORDER — HYDROCHLOROTHIAZIDE 25 MG/1
25 TABLET ORAL DAILY
Qty: 20 TABLET | Refills: 0 | Status: SHIPPED | OUTPATIENT
Start: 2024-09-07 | End: 2024-09-27

## 2024-09-07 RX ORDER — DIAZEPAM 5 MG/1
5 TABLET ORAL NIGHTLY PRN
Qty: 5 TABLET | Refills: 0 | Status: SHIPPED | OUTPATIENT
Start: 2024-09-07 | End: 2024-09-12

## 2024-09-07 RX ORDER — TRAMADOL HYDROCHLORIDE 50 MG/1
50 TABLET ORAL EVERY 6 HOURS PRN
Qty: 15 TABLET | Refills: 0 | Status: SHIPPED | OUTPATIENT
Start: 2024-09-07 | End: 2024-09-07

## 2024-09-07 RX ORDER — HYDROCHLOROTHIAZIDE 25 MG/1
25 TABLET ORAL ONCE
Status: COMPLETED | OUTPATIENT
Start: 2024-09-07 | End: 2024-09-07

## 2024-09-07 RX ORDER — OXYCODONE AND ACETAMINOPHEN 5; 325 MG/1; MG/1
1 TABLET ORAL ONCE
Status: COMPLETED | OUTPATIENT
Start: 2024-09-07 | End: 2024-09-07

## 2024-09-07 RX ORDER — KETOROLAC TROMETHAMINE 30 MG/ML
15 INJECTION, SOLUTION INTRAMUSCULAR; INTRAVENOUS ONCE
Status: COMPLETED | OUTPATIENT
Start: 2024-09-07 | End: 2024-09-07

## 2024-09-07 RX ADMIN — OXYCODONE HYDROCHLORIDE AND ACETAMINOPHEN 1 TABLET: 5; 325 TABLET ORAL at 17:57

## 2024-09-07 RX ADMIN — HYDROCHLOROTHIAZIDE 25 MG: 25 TABLET ORAL at 21:34

## 2024-09-07 RX ADMIN — KETOROLAC TROMETHAMINE 15 MG: 30 INJECTION, SOLUTION INTRAMUSCULAR at 17:57

## 2024-09-07 ASSESSMENT — PAIN DESCRIPTION - ORIENTATION
ORIENTATION: RIGHT

## 2024-09-07 ASSESSMENT — COLUMBIA-SUICIDE SEVERITY RATING SCALE - C-SSRS
2. HAVE YOU ACTUALLY HAD ANY THOUGHTS OF KILLING YOURSELF?: NO
1. IN THE PAST MONTH, HAVE YOU WISHED YOU WERE DEAD OR WISHED YOU COULD GO TO SLEEP AND NOT WAKE UP?: NO
6. HAVE YOU EVER DONE ANYTHING, STARTED TO DO ANYTHING, OR PREPARED TO DO ANYTHING TO END YOUR LIFE?: NO

## 2024-09-07 ASSESSMENT — PAIN - FUNCTIONAL ASSESSMENT: PAIN_FUNCTIONAL_ASSESSMENT: 0-10

## 2024-09-07 ASSESSMENT — PAIN DESCRIPTION - LOCATION
LOCATION: LEG
LOCATION: HIP
LOCATION: LEG

## 2024-09-07 ASSESSMENT — PAIN DESCRIPTION - PAIN TYPE: TYPE: ACUTE PAIN

## 2024-09-07 ASSESSMENT — PAIN SCALES - GENERAL
PAINLEVEL_OUTOF10: 6
PAINLEVEL_OUTOF10: 10 - WORST POSSIBLE PAIN
PAINLEVEL_OUTOF10: 10 - WORST POSSIBLE PAIN

## 2024-09-07 ASSESSMENT — PAIN DESCRIPTION - ONSET: ONSET: GRADUAL

## 2024-09-07 ASSESSMENT — PAIN DESCRIPTION - PROGRESSION
CLINICAL_PROGRESSION: GRADUALLY WORSENING
CLINICAL_PROGRESSION: GRADUALLY IMPROVING

## 2024-09-07 ASSESSMENT — PAIN DESCRIPTION - DIRECTION: RADIATING_TOWARDS: RIGHT LEG

## 2024-09-07 ASSESSMENT — PAIN DESCRIPTION - FREQUENCY: FREQUENCY: CONSTANT/CONTINUOUS

## 2024-09-07 NOTE — ED TRIAGE NOTES
Pt came in for having right hip pain that goes down to his right leg. Pt woke up with the pain, Pt has been taking methylprednisolone. Pt can't bear weight on it without any pain.

## 2024-09-07 NOTE — ED PROVIDER NOTES
Chief Complaint   Patient presents with    Hip Pain     Right side    Leg Pain     Right side     HPI:   Claude E Yelder is an 73 y.o. male with history of BPH, MDD, GERD presents to the ED with spouse for evaluation of right hip pain.  Patient reports that pain began about 1 week ago.  He localizes it to the right buttock and states that it radiates down the back of his leg to just below his right knee. He reiterates that pain is not in his low back and points to his right mid buttock.  Rates pain 10/10.  It is worse with standing, sitting, sleeping.  Describes it as achy but when it shoots down his leg it is tingling.  Took Aleve earlier today with no relief.  Saw his orthopedist on Thursday for same complaint and was initiated on Medrol Dosepak for likely sciatica.  He said this is not working.  He denies trauma or injury. He denies any loss of bowel or bladder, urinary retention, saddle anesthesia, groin pain, dysuria, testicular pain or swelling, numbness, muscle weakness, fevers, night sweats, weight loss, chest pain or shortness of breath.  Patient denies ever having this problem before.  Has never had surgery on this joint.    Medications: Denies daily medications  Soc HX: medical marijuana, quit smoking 8 mos ago  No Known Allergies: NKDA  Past Medical History:   Diagnosis Date    Personal history of other diseases of the musculoskeletal system and connective tissue 07/31/2018    History of radiculopathy     History reviewed. No pertinent surgical history.  No family history on file.     Physical Exam  Vitals and nursing note reviewed.   Constitutional:       General: He is not in acute distress.     Appearance: He is not ill-appearing or toxic-appearing.      Comments: Appears uncomfortable.  Sitting in awkward position in wheelchair.   Eyes:      Pupils: Pupils are equal, round, and reactive to light.   Cardiovascular:      Rate and Rhythm: Normal rate and regular rhythm.      Pulses: Normal pulses.       Heart sounds: Normal heart sounds.   Pulmonary:      Effort: Pulmonary effort is normal.      Breath sounds: Normal breath sounds.   Abdominal:      General: Bowel sounds are normal.      Palpations: Abdomen is soft.      Tenderness: There is no abdominal tenderness. There is no right CVA tenderness, left CVA tenderness, guarding or rebound.   Musculoskeletal:        Back:       Comments: Limited range of motion of right hip, knee and ankle secondary to pain.  Right hip is without warmth, erythema.   Skin:     General: Skin is warm and dry.   Neurological:      Mental Status: He is alert.      Cranial Nerves: No cranial nerve deficit.      Sensory: No sensory deficit.      Gait: Gait abnormal (Unable to test gait secondary to pain.  Patient had difficulty moving from seated to standing position due to pain.).      Comments: No midline spinal tenderness.  Positive right-sided straight leg raise     VS: As documented in the triage note and EMR flowsheet from this visit were reviewed.    External Records Reviewed: I reviewed recent and relevant outside records including: Reviewed orthopedist note 9/4/2024.  Patient seen for right hip pain x 1 week.  X-ray imaging was ordered that showed a mild DJD of the FA joint.  Degenerative changes to the lumbar spine.  Referred to spine and given Medrol Dosepak.      Medical Decision Making:   ED Course as of 09/08/24 1548   Sat Sep 07, 2024   1734 Vitals Reviewed: Afebrile. Hypertensive. Not tachycardic nor tachypneic. No hypoxia.   [KA]   1758 Patient is 73-year-old male who presents to the ED for evaluation of right buttock/hip pain.  He appears uncomfortable.  He has positive right-sided straight leg raise.  Will not allow me to assess range of motion of the right hip, ankle or knee secondary to pain.  Had difficulty standing up out of the wheelchair due to pain.  Inspected the hip joint.  There is no warmth, erythema, edema and I do not suspect septic arthritis.  Do not feel  he necessitates blood work.  Suspect sciatica versus piriformis syndrome.  I reviewed his x-ray from 3 days ago which showed mild DJD no other bony abnormalities.  Do not feel he necessitates repeat exposure to radiation.  He is requesting MRI, advised patient we do not perform MRIs of the hip in the ER.  He denies any urinary symptoms, UA not indicated.  Because he is a bounce back, will be staffed with my attending.  Patient to receive IM Toradol and p.o. Percocet for pain. [KA]   1916 Attending evaluated patient and recommends obtaining duplex ultrasound for subtle right calf swelling.  Patient did quit smoking 8 months ago and if there is any underlying malignancy could make him more hypercoagulable.  I have placed order for RLE duplex.  Patient be signed out to attending pending imaging [KA]      ED Course User Index  [KA] Shanelle Urbina PA-C         Diagnoses as of 09/08/24 1548   Right sided sciatica      Escalation of Care: Appropriate for outpatient management       Discussion of Management with Other Providers:  I discussed the patient/results with: Attending Lj    Counseling: Spoke with the patient and discussed today´s findings, in addition to providing specific details for the plan of care and expected course.  Patient was given the opportunity to ask questions.    Discussed return precautions and importance of follow-up.  Advised to follow-up with orthopedic and/or PCP.  Advised to return to the ED for changing or worsening symptoms, new symptoms, complaint specific precautions, and precautions listed on the discharge paperwork.  Educated on the common potential side effects of medications prescribed.    I advised the patient that the emergency evaluation and treatment provided today doesn't end their need for medical care. It is very important that they follow-up with their primary care provider or other specialist as instructed.    The plan of care was mutually agreed upon with the patient.  The patient and/or family were given the opportunity to ask questions. All questions asked today in the ED were answered to the best of my ability with today's information.    I specifically advised the patient to return to the ED for changing or worsening symptoms, worrisome new symptoms, or for any complaint specific precautions listed on the discharge paperwork.    This patient was cared for in the setting of nationwide stress on resources and staffing.    This report was transcribed using voice recognition software.  Every effort was made to ensure accuracy, however, inadvertently computerized transcription errors may be present.       Shanelle Urbina PA-C  09/08/24 1547

## 2024-09-16 ENCOUNTER — APPOINTMENT (OUTPATIENT)
Dept: ORTHOPEDIC SURGERY | Facility: CLINIC | Age: 74
End: 2024-09-16
Payer: MEDICARE

## 2024-09-17 ENCOUNTER — APPOINTMENT (OUTPATIENT)
Dept: ORTHOPEDIC SURGERY | Facility: CLINIC | Age: 74
End: 2024-09-17
Payer: MEDICARE

## 2024-09-17 ENCOUNTER — HOSPITAL ENCOUNTER (OUTPATIENT)
Dept: RADIOLOGY | Facility: CLINIC | Age: 74
Discharge: HOME | End: 2024-09-17
Payer: MEDICARE

## 2024-09-17 DIAGNOSIS — M41.9 SCOLIOSIS OF LUMBAR SPINE, UNSPECIFIED SCOLIOSIS TYPE: ICD-10-CM

## 2024-09-17 DIAGNOSIS — M47.816 LUMBAR SPONDYLOSIS: Primary | ICD-10-CM

## 2024-09-17 DIAGNOSIS — M47.816 LUMBAR SPONDYLOSIS: ICD-10-CM

## 2024-09-17 DIAGNOSIS — M48.062 SPINAL STENOSIS OF LUMBAR REGION WITH NEUROGENIC CLAUDICATION: ICD-10-CM

## 2024-09-17 DIAGNOSIS — M54.16 LUMBAR RADICULOPATHY: ICD-10-CM

## 2024-09-17 DIAGNOSIS — F41.1 ANXIETY STATE: ICD-10-CM

## 2024-09-17 PROCEDURE — 1123F ACP DISCUSS/DSCN MKR DOCD: CPT | Performed by: PHYSICIAN ASSISTANT

## 2024-09-17 PROCEDURE — 72120 X-RAY BEND ONLY L-S SPINE: CPT

## 2024-09-17 PROCEDURE — 1159F MED LIST DOCD IN RCRD: CPT | Performed by: PHYSICIAN ASSISTANT

## 2024-09-17 PROCEDURE — 99215 OFFICE O/P EST HI 40 MIN: CPT | Performed by: PHYSICIAN ASSISTANT

## 2024-09-17 PROCEDURE — 1036F TOBACCO NON-USER: CPT | Performed by: PHYSICIAN ASSISTANT

## 2024-09-17 PROCEDURE — 1125F AMNT PAIN NOTED PAIN PRSNT: CPT | Performed by: PHYSICIAN ASSISTANT

## 2024-09-17 RX ORDER — DIAZEPAM 10 MG/1
TABLET ORAL
Qty: 1 TABLET | Refills: 0 | Status: SHIPPED | OUTPATIENT
Start: 2024-09-17

## 2024-09-17 RX ORDER — PREDNISONE 10 MG/1
TABLET ORAL
Qty: 24 TABLET | Refills: 0 | Status: SHIPPED | OUTPATIENT
Start: 2024-09-17 | End: 2024-09-23

## 2024-09-17 RX ORDER — GABAPENTIN 300 MG/1
300 CAPSULE ORAL 2 TIMES DAILY
Qty: 60 CAPSULE | Refills: 2 | Status: SHIPPED | OUTPATIENT
Start: 2024-09-17 | End: 2024-12-16

## 2024-09-17 ASSESSMENT — PAIN - FUNCTIONAL ASSESSMENT: PAIN_FUNCTIONAL_ASSESSMENT: 0-10

## 2024-09-17 ASSESSMENT — PAIN SCALES - GENERAL: PAINLEVEL_OUTOF10: 9
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
## 2024-09-23 ENCOUNTER — APPOINTMENT (OUTPATIENT)
Dept: PRIMARY CARE | Facility: CLINIC | Age: 74
End: 2024-09-23
Payer: MEDICARE

## 2024-10-02 ENCOUNTER — HOSPITAL ENCOUNTER (OUTPATIENT)
Dept: RADIOLOGY | Facility: HOSPITAL | Age: 74
Discharge: HOME | End: 2024-10-02
Payer: MEDICARE

## 2024-10-02 DIAGNOSIS — M54.16 LUMBAR RADICULOPATHY: ICD-10-CM

## 2024-10-02 PROCEDURE — 72148 MRI LUMBAR SPINE W/O DYE: CPT | Performed by: RADIOLOGY

## 2024-10-02 PROCEDURE — 72148 MRI LUMBAR SPINE W/O DYE: CPT

## 2024-10-03 ENCOUNTER — OFFICE VISIT (OUTPATIENT)
Dept: PAIN MEDICINE | Facility: HOSPITAL | Age: 74
End: 2024-10-03
Payer: MEDICARE

## 2024-10-03 DIAGNOSIS — M48.062 SPINAL STENOSIS OF LUMBAR REGION WITH NEUROGENIC CLAUDICATION: Primary | ICD-10-CM

## 2024-10-03 DIAGNOSIS — M54.16 ACUTE LUMBAR RADICULOPATHY: ICD-10-CM

## 2024-10-03 PROCEDURE — 99214 OFFICE O/P EST MOD 30 MIN: CPT | Performed by: ANESTHESIOLOGY

## 2024-10-03 PROCEDURE — 1125F AMNT PAIN NOTED PAIN PRSNT: CPT | Performed by: ANESTHESIOLOGY

## 2024-10-03 PROCEDURE — 1123F ACP DISCUSS/DSCN MKR DOCD: CPT | Performed by: ANESTHESIOLOGY

## 2024-10-03 PROCEDURE — 1159F MED LIST DOCD IN RCRD: CPT | Performed by: ANESTHESIOLOGY

## 2024-10-03 RX ORDER — TRAMADOL HYDROCHLORIDE 50 MG/1
50 TABLET ORAL 3 TIMES DAILY PRN
Qty: 21 TABLET | Refills: 0 | Status: SHIPPED | OUTPATIENT
Start: 2024-10-03 | End: 2024-10-10

## 2024-10-03 ASSESSMENT — PAIN SCALES - GENERAL: PAINLEVEL: 7

## 2024-10-03 NOTE — PROGRESS NOTES
Chief Complaint   Patient presents with    Back Pain    Extremity Pain      HPI   Mr. Rajan is a 73-year-old male with a history of back pain.  The patient previously has been seen low not for 3 years or so.  He did have a known history of spinal stenosis most significant at the L4-5 level and last had a bilateral L4 transforaminal in November 2021.  The patient since his last epidural in 2021 he had no pain until 1 month ago.  Pain started abruptly without inciting event.  He says that this has been the worst pain ever.  He says that the pain is so severe he is crawling out of bed and crawling around his home.  He has pain that is across the low back and radiates into the right sided low back, buttock, and leg into the shin and calf.  He says the pain is burning and sharp.  He denies any comfortable position.  He says that he has been taking pills to get through.  He was seen in the emergency room as well since this started on 2 occasions.  ER doctor gave him benzodiazepines and some tramadol.  He was also given gabapentin.  The only medication he has left is gabapentin which he takes 600 mg twice daily.  He is also taking Aleve and reports taking up to 6 tabs of over-the-counter Aleve a day.  Pain is an 8-10 out of 10 at all times.  He did get an MRI which revealed ongoing stenosis at L4-5.  Additionally since onset of his pain he has been evaluated by orthopedic spine surgery.    ROS: 13 point review of systems is complete and is negative listed above in HPI    Past Medical History:   Diagnosis Date    Personal history of other diseases of the musculoskeletal system and connective tissue 07/31/2018    History of radiculopathy       No past surgical history on file.    No family history on file.    Social History     Tobacco Use    Smoking status: Former     Types: Cigarettes    Smokeless tobacco: Never       Current Outpatient Medications on File Prior to Visit   Medication Sig Dispense Refill    baclofen  (Lioresal) 10 mg tablet Take 1 tablet (10 mg) by mouth 3 times a day for 5 days. As needed for muscle spasm 15 tablet 0    diazePAM (Valium) 10 mg tablet Take 1 tab po 45min prior to MRI. You will need a  day of procedure 1 tablet 0    diazePAM (Valium) 5 mg tablet Take 1 tablet (5 mg) by mouth as needed at bedtime for anxiety or muscle spasms for up to 5 days. 5 tablet 0    FLUoxetine (PROzac) 20 mg capsule Take 1 capsule (20 mg) by mouth once daily.      gabapentin (Neurontin) 300 mg capsule Take 1 capsule (300 mg) by mouth 2 times a day. 60 capsule 2    hydroCHLOROthiazide (HYDRODiuril) 25 mg tablet Take 1 tablet (25 mg) by mouth once daily for 20 days. 20 tablet 0    methocarbamol (Robaxin) 500 mg tablet Take 1 tablet (500 mg) by mouth 3 times a day as needed for muscle spasms for up to 7 days. 21 tablet 0    naproxen sodium (Aleve) 220 mg tablet Take 1 tablet (220 mg) by mouth every 12 hours.      omeprazole (PriLOSEC) 40 mg DR capsule Take 1 capsule (40 mg) by mouth once daily in the morning. Take before meals. Do not crush or chew. 30 capsule 1    sildenafil (Viagra) 100 mg tablet Take 1 tablet (100 mg) by mouth if needed for erectile dysfunction. 30 tablet 6     No current facility-administered medications on file prior to visit.        No Known Allergies       Imaging:  Narrative & Impression   Interpreted By:  Bernardo Gomez,   STUDY:  MR LUMBAR SPINE WO IV CONTRAST;  10/2/2024 1:39 pm      INDICATION:  Signs/Symptoms:pain.      COMPARISON:  11/01/2021      ACCESSION NUMBER(S):  [id number]      ORDERING CLINICIAN:  IVANIA DAVIS      TECHNIQUE:  Sagittal STIR, sagittal T2, sagittal T1, axial T2, axial T1 weighted  MRI images of the lumbar spine were obtained without intravenous  contrast administration.      FINDINGS:  The coronal  images again demonstrate a levocurvature of the  lumbar spine.      There are degenerative signal changes noted along endplates within  lumbar region most  pronounced at the L3/4 and L4/5 levels.      There is a 11 mm hemangioma within the L2 vertebral body.      The visualized spinal cord demonstrates no signal abnormality within  it.  The conus medullaris is normally positioned terminating at the  L1/2 level.      There is again evidence of a small linear focus of fat signal along  the filum terminale compatible with a fatty filum.      At the L5/S1 level,  there is mild posterior osteophytic spurring  posterior disc bulge along with degenerative facet changes and  ligamentum flavum hypertrophy contributing to mild spinal canal  narrowing. There is moderate encroachment upon the neural foramen  bilaterally left greater than right.      At the L4/L5 level,  there is posterior osteophytic spurring, a  posterior disc bulge along with hypertrophic degenerative facet  changes and ligamentum flavum hypertrophy contributing to severe  narrowing of the thecal sac in the AP dimension within the spinal  canal. There is severe left and moderate to severe right-sided neural  foraminal narrowing.      At the L3/L4 level,  there is posterior osteophytic spurring and a  posterior disc bulge along with degenerative facet changes. There is  encroachment upon the lateral recesses right greater than left. There  is mild overall narrowing of the thecal sac within the spinal canal.  There is moderate right and mild-to-moderate left-sided neural  foraminal narrowing      At the L2/L3 level,  there is posterior osteophytic spurring and  posterior disc bulge along with degenerative facet changes  contributing to mild-to-moderate spinal canal narrowing. There is  moderate right and mild left-sided neural foraminal narrowing.      At the L1/L2 level,  there is posterior osteophytic spurring and  posterior disc bulge asymmetrically more pronounced to the left of  midline along with degenerative facet changes. There is mild overall  narrowing of the thecal sac within the spinal canal there is  moderate  left and mild right-sided neural foraminal narrowing      At the T12/L1 level,  there are degenerative facet changes without  significant spinal canal or neural foraminal narrowing.      At the T11/12 level, there are degenerative facet changes without  significant spinal canal contributing to mild encroachment upon the  neural foramen left greater than right. There is no significant  spinal canal narrowing.      IMPRESSION:  The coronal  images again demonstrate a levocurvature of the  lumbar spine.      There is multilevel spondylosis with varying degrees of spinal canal  and neural foraminal narrowing as described above.           Physical Exam:  Gen.: Pleasant, appears stated age  Eyes: Pupils are symmetric  ENT: Hearing is grossly intact  Neck: No JVD noted, tracheal position is midline  Respiratory: No gasping or shortness of breath noted, no use of accessory muscles noted  Cardiovascular: Extremity show no edema   Skin: No rashes or open lesions or ulcers identified on the skin  Musculoskeletal: Positive straight leg raise on right side only, intact strength and sensation bilateral lower extremities outside of some slight weakness with dorsiflexion on the right side which may be limited by pain.  Normal reflex.  Neurologic: Cranial nerves II through XII are grossly intact  Psychiatric:  Patient is alert and oriented x3    Impression/Plan:  73-year-old male with a history of low back and right leg pain who has known spinal stenosis which is severe in nature.  His MRI is not significantly changed from prior and he responded well with 3 years of relief after his last transforaminal.  He would like to pursue a repeat injection.    -Continue gabapentin 600 twice daily.  Side effect profile and risk reviewed.  If this medication is stopped later would need to be weaned down rather than stop abruptly.    -Reviewed his OARRS.  Patient has not had marijuana prescriptions since April.  He did have a  recent prescription for tramadol and a benzo that was given by the ER physician which he is no longer using, prescription ran out.  Will give a short course of tramadol to be taken sparingly in between now and his injection.  Patient reports severe and unrelenting pain that is not well-managed otherwise.  Risks associated with medication reviewed.  Advised not to take concurrently with other medications outside of things he is currently prescribed.  Counseled on Narcan use.    -Will plan for repeat bilateral L4 transforaminal.  Procedure, risk, benefits, alternatives reviewed.

## 2024-10-08 ENCOUNTER — APPOINTMENT (OUTPATIENT)
Dept: PAIN MEDICINE | Facility: HOSPITAL | Age: 74
End: 2024-10-08
Payer: MEDICARE

## 2024-10-18 ENCOUNTER — HOSPITAL ENCOUNTER (OUTPATIENT)
Facility: HOSPITAL | Age: 74
Discharge: HOME | End: 2024-10-18
Payer: MEDICARE

## 2024-10-18 VITALS
DIASTOLIC BLOOD PRESSURE: 76 MMHG | HEART RATE: 63 BPM | OXYGEN SATURATION: 100 % | HEIGHT: 71 IN | BODY MASS INDEX: 23.52 KG/M2 | SYSTOLIC BLOOD PRESSURE: 134 MMHG | WEIGHT: 168 LBS | TEMPERATURE: 98.2 F | RESPIRATION RATE: 18 BRPM

## 2024-10-18 DIAGNOSIS — M54.16 ACUTE LUMBAR RADICULOPATHY: ICD-10-CM

## 2024-10-18 PROCEDURE — 2500000004 HC RX 250 GENERAL PHARMACY W/ HCPCS (ALT 636 FOR OP/ED): Performed by: ANESTHESIOLOGY

## 2024-10-18 PROCEDURE — 64483 NJX AA&/STRD TFRM EPI L/S 1: CPT | Mod: 50 | Performed by: ANESTHESIOLOGY

## 2024-10-18 PROCEDURE — 3700000012 HC SEDATION LEVEL 5+ TIME - INITIAL 15 MINUTES 5/> YEARS

## 2024-10-18 PROCEDURE — 2720000007 HC OR 272 NO HCPCS

## 2024-10-18 PROCEDURE — 64483 NJX AA&/STRD TFRM EPI L/S 1: CPT | Performed by: ANESTHESIOLOGY

## 2024-10-18 PROCEDURE — 2550000001 HC RX 255 CONTRASTS: Performed by: ANESTHESIOLOGY

## 2024-10-18 RX ORDER — LIDOCAINE HYDROCHLORIDE 5 MG/ML
INJECTION, SOLUTION INFILTRATION; INTRAVENOUS
Status: COMPLETED | OUTPATIENT
Start: 2024-10-18 | End: 2024-10-18

## 2024-10-18 RX ORDER — MIDAZOLAM HYDROCHLORIDE 1 MG/ML
INJECTION, SOLUTION INTRAMUSCULAR; INTRAVENOUS
Status: COMPLETED | OUTPATIENT
Start: 2024-10-18 | End: 2024-10-18

## 2024-10-18 RX ORDER — DEXAMETHASONE SODIUM PHOSPHATE 10 MG/ML
INJECTION INTRAMUSCULAR; INTRAVENOUS
Status: DISCONTINUED
Start: 2024-10-18 | End: 2024-10-19 | Stop reason: HOSPADM

## 2024-10-18 RX ORDER — MIDAZOLAM HYDROCHLORIDE 1 MG/ML
INJECTION INTRAMUSCULAR; INTRAVENOUS
Status: DISCONTINUED
Start: 2024-10-18 | End: 2024-10-19 | Stop reason: HOSPADM

## 2024-10-18 RX ORDER — LIDOCAINE HYDROCHLORIDE 5 MG/ML
INJECTION, SOLUTION INFILTRATION; INTRAVENOUS
Status: DISCONTINUED
Start: 2024-10-18 | End: 2024-10-19 | Stop reason: HOSPADM

## 2024-10-18 RX ORDER — DEXAMETHASONE SODIUM PHOSPHATE 10 MG/ML
INJECTION INTRAMUSCULAR; INTRAVENOUS
Status: COMPLETED | OUTPATIENT
Start: 2024-10-18 | End: 2024-10-18

## 2024-10-18 ASSESSMENT — PAIN - FUNCTIONAL ASSESSMENT
PAIN_FUNCTIONAL_ASSESSMENT: 0-10

## 2024-10-18 ASSESSMENT — PAIN SCALES - GENERAL
PAINLEVEL_OUTOF10: 10 - WORST POSSIBLE PAIN

## 2024-10-18 ASSESSMENT — COLUMBIA-SUICIDE SEVERITY RATING SCALE - C-SSRS
6. HAVE YOU EVER DONE ANYTHING, STARTED TO DO ANYTHING, OR PREPARED TO DO ANYTHING TO END YOUR LIFE?: NO
1. IN THE PAST MONTH, HAVE YOU WISHED YOU WERE DEAD OR WISHED YOU COULD GO TO SLEEP AND NOT WAKE UP?: NO
2. HAVE YOU ACTUALLY HAD ANY THOUGHTS OF KILLING YOURSELF?: NO

## 2024-10-18 NOTE — H&P
HISTORY AND PHYSICAL    History Of Present Illness  Claude E Yelder is a 73 y.o. male presenting with chronic pain here for procedure as stated below. Patient denies any changes to health since the last visit to our clinic.    Past Medical History  Past Medical History:   Diagnosis Date    Personal history of other diseases of the musculoskeletal system and connective tissue 07/31/2018    History of radiculopathy       Surgical History  No past surgical history on file.     Social History  He reports that he has quit smoking. His smoking use included cigarettes. He has never used smokeless tobacco. No history on file for alcohol use and drug use.    Family History  No family history on file.     Allergies  Patient has no known allergies.    Review of Systems  12 point ROS done and negative except for the above.     Physical Exam  General: NAD, well groomed, well nourished  Eyes: Non-icteric sclera, EOMI  Ears, Nose, Mouth, and Throat: External ears and nose appear to be without deformity or rash. No lesions or masses noted. Hearing is grossly intact.   Neck: Trachea midline  Respiratory: Nonlabored breathing   Cardiovascular: No peripheral edema   Skin: No rashes or open lesions/ulcers identified on skin.      Last Recorded Vitals  There were no vitals taken for this visit.    Relevant Results  Current Outpatient Medications   Medication Instructions    baclofen (LIORESAL) 10 mg, oral, 3 times daily, As needed for muscle spasm    diazePAM (Valium) 10 mg tablet Take 1 tab po 45min prior to MRI. You will need a  day of procedure    diazePAM (VALIUM) 5 mg, oral, Nightly PRN    FLUoxetine (PROZAC) 20 mg, oral, Daily    gabapentin (NEURONTIN) 300 mg, oral, 2 times daily    hydroCHLOROthiazide (HYDRODIURIL) 25 mg, oral, Daily    methocarbamol (ROBAXIN) 500 mg, oral, 3 times daily PRN    naproxen sodium (ALEVE) 220 mg, oral, Every 12 hours    omeprazole (PRILOSEC) 40 mg, oral, Daily before breakfast, Do not crush or  chew.    sildenafil (VIAGRA) 100 mg, oral, As needed         MR lumbar spine wo IV contrast 10/02/2024    Narrative  Interpreted By:  Bernardo Gomez,  STUDY:  MR LUMBAR SPINE WO IV CONTRAST;  10/2/2024 1:39 pm    INDICATION:  Signs/Symptoms:pain.    COMPARISON:  11/01/2021    ACCESSION NUMBER(S):  [id number]    ORDERING CLINICIAN:  IVANIA DAVIS    TECHNIQUE:  Sagittal STIR, sagittal T2, sagittal T1, axial T2, axial T1 weighted  MRI images of the lumbar spine were obtained without intravenous  contrast administration.    FINDINGS:  The coronal  images again demonstrate a levocurvature of the  lumbar spine.    There are degenerative signal changes noted along endplates within  lumbar region most pronounced at the L3/4 and L4/5 levels.    There is a 11 mm hemangioma within the L2 vertebral body.    The visualized spinal cord demonstrates no signal abnormality within  it.  The conus medullaris is normally positioned terminating at the  L1/2 level.    There is again evidence of a small linear focus of fat signal along  the filum terminale compatible with a fatty filum.    At the L5/S1 level,  there is mild posterior osteophytic spurring  posterior disc bulge along with degenerative facet changes and  ligamentum flavum hypertrophy contributing to mild spinal canal  narrowing. There is moderate encroachment upon the neural foramen  bilaterally left greater than right.    At the L4/L5 level,  there is posterior osteophytic spurring, a  posterior disc bulge along with hypertrophic degenerative facet  changes and ligamentum flavum hypertrophy contributing to severe  narrowing of the thecal sac in the AP dimension within the spinal  canal. There is severe left and moderate to severe right-sided neural  foraminal narrowing.    At the L3/L4 level,  there is posterior osteophytic spurring and a  posterior disc bulge along with degenerative facet changes. There is  encroachment upon the lateral recesses right greater  than left. There  is mild overall narrowing of the thecal sac within the spinal canal.  There is moderate right and mild-to-moderate left-sided neural  foraminal narrowing    At the L2/L3 level,  there is posterior osteophytic spurring and  posterior disc bulge along with degenerative facet changes  contributing to mild-to-moderate spinal canal narrowing. There is  moderate right and mild left-sided neural foraminal narrowing.    At the L1/L2 level,  there is posterior osteophytic spurring and  posterior disc bulge asymmetrically more pronounced to the left of  midline along with degenerative facet changes. There is mild overall  narrowing of the thecal sac within the spinal canal there is moderate  left and mild right-sided neural foraminal narrowing    At the T12/L1 level,  there are degenerative facet changes without  significant spinal canal or neural foraminal narrowing.    At the T11/12 level, there are degenerative facet changes without  significant spinal canal contributing to mild encroachment upon the  neural foramen left greater than right. There is no significant  spinal canal narrowing.    Impression  The coronal  images again demonstrate a levocurvature of the  lumbar spine.    There is multilevel spondylosis with varying degrees of spinal canal  and neural foraminal narrowing as described above.    MACRO:  None.    Signed by: Bernardo Gomez 10/3/2024 8:11 AM  Dictation workstation:   [id number]      XR hip right with pelvis when performed 2 or 3 views 09/04/2024    Narrative  Interpreted By:  Kaylen Hammer,  STUDY:  Single view pelvis.  Right hip, two views.    INDICATION:  Signs/Symptoms:pain.    COMPARISON:  None.    ACCESSION NUMBER(S):  [id number]    ORDERING CLINICIAN:  TANG CARPENTER    FINDINGS:  No acute fracture or malalignment.  Bilateral hip joint spaces are well preserved.  Mild bilateral hip osteoarthrosis with acetabular osteophytes.  Advanced lumbar spine degenerative changes  with disc height loss and  osteophytes. Soft tissues are within normal limits.    Impression  1. Mild bilateral hip osteoarthrosis with osteophytosis.  2. Advanced lumbar spine degenerative changes.    MACRO:  None.    Signed by: Kaylen Hammer 9/5/2024 9:55 PM  Dictation workstation:   [id number]     No image results found.     1. Acute lumbar radiculopathy  [place] pain management    [place] pain management    Transforaminal    Transforaminal              Assessment/Plan   Claude E Yelder is a 73 y.o. male presenting with chronic pain here for Bilateral lumbar transforaminal epidural steroid injections at the L4 level; he denies any recent antibiotic use or infections, he denies any blood thinner use, and he denies contrast or local anesthetic allergies.    ASA PS Classification: 3  Mallampati score: 2    Plan:  - We will proceed with the procedure as above. We discussed extensively the risks, benefits, and alternatives to the procedure. The patient's questions were addressed and answered in detail. The patient demonstrated understanding of the procedure, and is amenable to proceeding with it. The Risks of the procedure that were discussed with the patient include but are not limited to the following: A lack of efficacy, transient worsening of pain, bleeding, infection, nerve injury, nerve damage, neuritis or sunburn sensation. Informed consent obtained as attached to EMR.  - Patient will follow-up with us in clinic.  - Patient to continue physician directed home exercises as tolerated.      Ivan Pearson, PGY-2

## 2024-10-18 NOTE — DISCHARGE INSTRUCTIONS
DISCHARGE INSTRUCTIONS FOR INJECTIONS     You underwent bilateral lumbar (L5) transforaminal epidural steroid injection today    After most injections, it is recommended that you relax and limit your activity for the remainder of the day unless you have been told otherwise by your pain physician.  You should not drive a car, operate machinery, or make important legal decisions unless otherwise directed by your pain physician.  You may resume your normal activity, including exercise, tomorrow.      Keep a written pain diary of how much pain relief you experienced following the injection procedure and the length of time of pain relief you experienced pain relief. Following diagnostic injections like medial branch nerve blocks, sacroiliac joint blocks, stellate ganglion injections and other blocks, it is very important you record the specific amount of pain relief you experienced immediately after the injectionand how long it lasted. Your doctor will ask you for this information at your follow up visit.     For all injections, please keep the injection site dry and inspect the site for a couple of days. You may remove the Band-Aid the day of the injection at any time.     Some discomfort, bruising or slight swelling may occur at the injection site. This is not abnormal if it occurs.  If needed you may:    -Take over the counter medication such as Tylenol or Motrin.   -Apply an ice pack for 30 minutes, 2 to 3 times a day for the first 24 hours.     You may shower today; no soaking baths, hot tubs, whirlpools or swimming pools for two days.      If you are given steroids in your injection, it may take 3-5 days for the steroid medication to take effect. You may notice a worsening of your symptoms for 1-2 days after the injection. This is not abnormal.  You may use acetaminophen, ibuprofen, or prescription medication that your doctor may have prescribed for you if you need to do so.     A few common side effects of  steroids include facial flushing, sweating, restlessness, irritability,difficulty sleeping, increase in blood sugar, and increased blood pressure. If you have diabetes, please monitor your blood sugar at least once a day for at least 5 days. If you have poorly controlled high blood pressure, monitoryour blood pressure for at least 2 days and contact your primary care physician if these numbers are unusually high for you.      If you take aspirin or non-steroidal anti-inflammatory drugs (examples are Motrin, Advil, ibuprofen, Naprosyn, Voltaren, Relafen, etc.) you may restart these this evening, but stop taking it 3 days before your next appointment, unless instructed otherwiseby your physician.      You do not need to discontinue non-aspirin-containing pain medications prior to an injection (examples: Celebrex, tramadol, hydrocodone and acetaminophen).      If you take a blood thinning medication (Coumadin, Lovenox, Fragmin,Ticlid, Plavix, Pradaxa, etc.), please discuss this with your primary care physician/cardiologist and your pain physician. These medications MUST be discontinued before you can have an injection safely, without the risk of uncontrolled bleeding. If these medications are not discontinued for an appropriate period of time, you will not be able to receivean injection. Please adhere to instructions given to you about when to restart your blood thinning medication. If you have any questions please reach out to our team.    If you are taking Coumadin, please have your INR checked the morning of your procedure and bring the result to your appointment unless otherwise instructed. If your INR is over 1.2, your injection may need to be rescheduled to avoid uncontrolled bleeding from the needle placement.     Call [facility]  and ask for Pain Management at [phone] between 8am-4pm Monday - Friday if you are experiencing the following:    If you received an epidural or spinal injection:    -Headache that  doesnot go away with medicine, is worse when sitting or standing up, and is greatly relieved upon lying down.   -Severe pain worse than or different than your baseline pain.   -Chills or fever (101º F or greater).   -Drainage or signs of infection at the injection site     Go directly to the Emergency Department if you are experiencing the following and received an epidural or spinal injection:   -Abrupt weakness or progressive weakness in your legs that starts after you leave the clinic.   -Abrupt severe or worsening numbness in your legs.   -Inability to urinate after the injection or loss of bowel or bladder control without the urge to defecate or urinate.     If you have a clinical question that cannot wait until your next appointment, please call [phone] between 8am-4pm Monday - Friday or send a [facility] message. We do our best to return all non-emergency messages within 24 hours, Monday - Friday. A nurse or physician will return your message. You may also try calling and they will do their best to answer your question(s):  - Dr. Ramírez Dickey's nurse ([phone])  - Dr. Santana Jeff/Dr. Boston's nurse ([phone])  - Dr. Galina Zelaya/Angy's nurse ([phone])     If you need to cancel an appointment, please call the scheduling staff at [phone] during normal business hours or leave a message at least 24 hours in advance.     If you are going to be sedated for your next procedure, you MUST have responsible adult who can legally drive accompany you home. You cannot eat or drink for at least eight hours prior to the planned procedure if you are going to receive sedation. You may take your non-blood thinning medications with a small sip of water.

## 2025-01-06 ENCOUNTER — APPOINTMENT (OUTPATIENT)
Dept: UROLOGY | Facility: CLINIC | Age: 75
End: 2025-01-06
Payer: MEDICARE

## 2025-01-09 ENCOUNTER — APPOINTMENT (OUTPATIENT)
Dept: PRIMARY CARE | Facility: CLINIC | Age: 75
End: 2025-01-09
Payer: MEDICARE

## 2025-02-19 ENCOUNTER — APPOINTMENT (OUTPATIENT)
Dept: UROLOGY | Facility: CLINIC | Age: 75
End: 2025-02-19
Payer: MEDICARE

## 2025-02-25 ENCOUNTER — APPOINTMENT (OUTPATIENT)
Dept: PRIMARY CARE | Facility: CLINIC | Age: 75
End: 2025-02-25
Payer: MEDICARE

## 2025-02-25 VITALS
HEIGHT: 71 IN | OXYGEN SATURATION: 95 % | BODY MASS INDEX: 24.22 KG/M2 | SYSTOLIC BLOOD PRESSURE: 155 MMHG | HEART RATE: 75 BPM | DIASTOLIC BLOOD PRESSURE: 78 MMHG | WEIGHT: 173 LBS

## 2025-02-25 DIAGNOSIS — Z00.00 HEALTH CARE MAINTENANCE: Primary | ICD-10-CM

## 2025-02-25 DIAGNOSIS — M54.31 RIGHT SIDED SCIATICA: ICD-10-CM

## 2025-02-25 DIAGNOSIS — Z12.5 ENCOUNTER FOR SCREENING PROSTATE SPECIFIC ANTIGEN (PSA) MEASUREMENT: ICD-10-CM

## 2025-02-25 DIAGNOSIS — E78.2 MIXED HYPERLIPIDEMIA: ICD-10-CM

## 2025-02-25 DIAGNOSIS — I10 HYPERTENSION, UNSPECIFIED TYPE: ICD-10-CM

## 2025-02-25 DIAGNOSIS — F41.9 ANXIETY: ICD-10-CM

## 2025-02-25 PROCEDURE — 1036F TOBACCO NON-USER: CPT | Performed by: INTERNAL MEDICINE

## 2025-02-25 PROCEDURE — 3077F SYST BP >= 140 MM HG: CPT | Performed by: INTERNAL MEDICINE

## 2025-02-25 PROCEDURE — 3078F DIAST BP <80 MM HG: CPT | Performed by: INTERNAL MEDICINE

## 2025-02-25 PROCEDURE — 99397 PER PM REEVAL EST PAT 65+ YR: CPT | Performed by: INTERNAL MEDICINE

## 2025-02-25 PROCEDURE — 1160F RVW MEDS BY RX/DR IN RCRD: CPT | Performed by: INTERNAL MEDICINE

## 2025-02-25 PROCEDURE — 1123F ACP DISCUSS/DSCN MKR DOCD: CPT | Performed by: INTERNAL MEDICINE

## 2025-02-25 PROCEDURE — G0439 PPPS, SUBSEQ VISIT: HCPCS | Performed by: INTERNAL MEDICINE

## 2025-02-25 PROCEDURE — 1170F FXNL STATUS ASSESSED: CPT | Performed by: INTERNAL MEDICINE

## 2025-02-25 PROCEDURE — 1159F MED LIST DOCD IN RCRD: CPT | Performed by: INTERNAL MEDICINE

## 2025-02-25 RX ORDER — FLUOXETINE HYDROCHLORIDE 20 MG/1
20 CAPSULE ORAL DAILY
Qty: 90 CAPSULE | Refills: 1 | Status: SHIPPED | OUTPATIENT
Start: 2025-02-25

## 2025-02-25 RX ORDER — HYDROCHLOROTHIAZIDE 25 MG/1
25 TABLET ORAL DAILY
Qty: 90 TABLET | Refills: 1 | Status: SHIPPED | OUTPATIENT
Start: 2025-02-25 | End: 2025-08-24

## 2025-02-25 ASSESSMENT — PATIENT HEALTH QUESTIONNAIRE - PHQ9
SUM OF ALL RESPONSES TO PHQ9 QUESTIONS 1 AND 2: 0
2. FEELING DOWN, DEPRESSED OR HOPELESS: NOT AT ALL
1. LITTLE INTEREST OR PLEASURE IN DOING THINGS: NOT AT ALL

## 2025-02-25 ASSESSMENT — ACTIVITIES OF DAILY LIVING (ADL)
GROCERY_SHOPPING: INDEPENDENT
DOING_HOUSEWORK: INDEPENDENT
BATHING: INDEPENDENT
TAKING_MEDICATION: INDEPENDENT
MANAGING_FINANCES: INDEPENDENT
DRESSING: INDEPENDENT

## 2025-02-25 NOTE — PROGRESS NOTES
"Subjective   Patient ID: Claude E Yelder is a 74 y.o. male who presents for Medicare Annual Wellness Visit Subsequent.        HPI     Patient is a 74-year-old male with past medical history of hypertension and lower back pain presents for Medicare wellness  Follows with pain management.  Currently not on any medications.  Takes naproxen over-the-counter for treatment of his lower back pain which seems to control the pain.  When he does not take the medication his pain can be 5 out of 10 in intensity and nonradiating.    With regards to his blood pressure he has been taking his blood pressure medications in quite a while.  Never requested prescription refills.  No headache changes in vision orthopnea    He lives alone at home.  He has 2 children who are grown.  Denies illicit substances smoking or alcohol    Review of Systems  Constitutional: No fever or chills, No Night Sweats  Eyes: No Blurry Vision or Eye sight problems  ENT: No Nasal Discharge, Hoarseness, sore throat  Cardiovascular: no chest pain, no palpitations and no syncope.   Respiratory: no cough, no shortness of breath during exertion and no shortness of breath at rest.   Gastrointestinal: no abdominal pain, no nausea and no vomiting.   : No issues with urinary stream, burning with urination, no blood in urine or stools  Skin: No Skin rashes or Lesions  Neuro: No Headache, no dizziness or Numbness or tingling  Psych: No Anxiety, depression or sleeping problems  Heme: No Easy bleeding or brusing.     Objective   /78   Pulse 75   Ht 1.803 m (5' 11\")   Wt 78.5 kg (173 lb)   SpO2 95%   BMI 24.13 kg/m²     Physical Exam  Constitutional: Alert and in no acute distress. Well developed, well nourished.   Head and Face: Head and face: Normal.    Eyes: Normal external exam. Pupils were equal in size, round, reactive to light (PERRL) with normal accommodation and extraocular movements intact (EOMI).   Ears, Nose, Mouth, and Throat: External inspection " of ears and nose: Normal.  Hearing: Normal.  Nasal mucosa, septum, and turbinates: Normal.  Lips, teeth, and gums: Normal.  Oropharynx: Normal.   Neck: No neck mass was observed. Supple. Thyroid not enlarged and there were no palpable thyroid nodules.   Cardiovascular: Heart rate and rhythm were normal, normal S1 and S2. Pedal pulses: Normal. No peripheral edema.   Pulmonary: No respiratory distress. Clear bilateral breath sounds.   Abdomen: Soft nontender; no abdominal mass palpated. Normal bowel sounds. No organomegaly.   : Deferred  Musculoskeletal: No joint swelling seen, normal movements of all extremities. Range of motion: Normal.  Muscle strength/tone: Normal.    Skin: Normal skin color and pigmentation, normal skin turgor, and no rash.   Neurologic: Deep tendon reflexes were 2+ and symmetric.   Psychiatric: Judgment and insight: Intact. Mood and affect: Normal.  Lymphatic: No cervical lymphadenopathy. Palpation of lymph nodes in axillae: Normal.  Palpation of lymph nodes in groin: Normal.    Lab Results   Component Value Date    WBC 7.0 05/28/2024    HGB 14.9 05/28/2024    HCT 47.3 05/28/2024     05/28/2024    CHOL 186 01/09/2024    TRIG 90 01/09/2024    HDL 38.0 01/09/2024    ALT 9 (L) 05/28/2024    AST 13 05/28/2024     05/28/2024    K 4.0 05/28/2024     05/28/2024    CREATININE 1.19 05/28/2024    BUN 14 05/28/2024    CO2 29 05/28/2024    TSH 1.71 05/28/2024    INR 1.1 09/22/2021       Transforaminal  Table formatting from the original result was not included.  Procedure  Transforaminal    Indication  Acute lumbar radiculopathy    Medications  dexAMETHasone (PF) (Decadron) injection 10 mg   lidocaine PF (Xylocaine) 5 mg/mL (0.5 %) injection 8 mL   midazolam (Versed) injection 2 mg   iohexol (OMNIPaque) 240 mg iodine/mL solution 2 mL   (Totals for administrations occurring from 1547 to 1600 on 10/18/24)     Preprocedure  A history and physical has been performed, and patient medication    allergies have been reviewed. The patient's tolerance of previous   anesthesia has been reviewed. The risks and benefits of the procedure and   the sedation options and risks were discussed with the patient. All   questions were answered and informed consent obtained.    Details of the Procedure  Preoperative diagnosis:  Lumbar radiculitis  Postoperative diagnosis:  Lumbar radiculitis, spinal stenosis  Procedure: Bilateral L4 transforaminal epidural steroid injection under   fluoroscopic guidance  Surgeon: Cortney Elmore  Assistant:  Fellow, Ramy  Anesthesia: Local +2 mg of midazolam for sedation  Complications: Apparently none    Clinical note: Mr. Rajan is a 73-year-old male with a history of back   pain and leg symptoms, known spinal stenosis who presents today for a   transforaminal injection.    Procedure note: The patient was met in the preoperative holding area after   risks benefits and alternatives to procedure were discussed with the   patient, informed consent was obtained. Patient brought back to the   procedure room and placed in the prone position on the fluoroscopy table.   Area over the bilateral low back was exposed, prepped, draped, in the   usual sterile fashion.  Skin and subcutaneous tissues to the neuroforamen   was anesthetized using 0.5% lidocaine, 5 mL.  22-gauge Sprotte needles   were inserted in the skin and advanced into the foramen bilaterally at L4   first on the left and then right sides. Needle tip position was confirmed   in AP oblique and lateral views.  Needles had to be slightly manipulated   to get appropriately placed in the foramen.  In the final position   bilaterally contrast was injected which showed appropriate epidural   spread, no intravascular or intrathecal uptake. A total of 3 mL of 0.5%   lidocaine mixed with 10 mg dexamethasone was injected in divided doses   among the 2 needles. Needles removed, bandage applied, patient tolerated   the procedure well with  no immediate complications.    Procedure Provider  Cortney Elmore MD    Procedure Location  [facility]  [address]  [facility] [address] [phone]    Referring Provider  Cortney Elmore MD  [id number]  Department Of Anesthesiology [place] [address]  [place] pain management  These images are not reportable by radiology and will not be interpreted   by  Radiologists.      Assessment/Plan   Problem List Items Addressed This Visit             ICD-10-CM    Mixed hyperlipidemia E78.2     Check LDL fasting  Advised Mediterranean diet regular exercise and weight reduction         Anxiety F41.9     Restart fluoxetine.  Follow-up in 2 to 3 months for reevaluation         Relevant Medications    FLUoxetine (PROzac) 20 mg capsule    Hypertension I10     Uncontrolled.  Restart HCTZ and follow-up 2 months for reevaluation         Relevant Orders    CBC     Other Visit Diagnoses         Codes    Health care maintenance    -  Primary Z00.00    Relevant Orders    Hepatitis C antibody    CBC    Comprehensive metabolic panel    Lipid Panel    Prostate Spec.Ag,Screen    Albumin-Creatinine Ratio, Urine Random    Right sided sciatica     M54.31    Relevant Medications    hydroCHLOROthiazide (HYDRODiuril) 25 mg tablet    Encounter for screening prostate specific antigen (PSA) measurement     Z12.5    Relevant Orders    Prostate Spec.Ag,Screen              Dear Claude E Yelder     It was my pleasure to take care of you today in the office. Below are the things we discussed today:    1. Immunizations: Yearly Flu shot is recommended.   Advised RSV shingles and tetanus to be done at the local pharmacy    2. Blood Work: Ordered  3. Seen your dentist twice a year  4. Yearly Eye exam is recommended    5. BMI: 24.1  6: Diet recommendations:   Eat Clean, Try to have as many home cooked meals as possible  Avoid processed foods which contain excess  calories, sugar, and sodium.    7. Exercise recommendations:   150 minutes a week to maintain your weight     If you have to loose weight, you need a better diet and exercise plan.     8. Please get your Living will / Advance directive completed if you do not have one already. Please make sure our office has a copy of the latest one.     9. Colonoscopy: Uptodate, repeat in   10. PSA:    11.     Follow up in one year for a Physical. Please call the office before your Physical to see if you need blood work completed prior to your physical.     Please call me if any questions arise from now until your next visit. I will call you after I am done seeing patients. A Doctor is always available by phone when the office is closed. Please feel free to call for help with any problem that you feel shouldn't wait until the office re-opens.     Shravan Rodriguez, DO

## 2025-02-26 LAB
ALBUMIN SERPL-MCNC: 4.2 G/DL (ref 3.6–5.1)
ALBUMIN/CREAT UR: 6 MG/G CREAT
ALP SERPL-CCNC: 93 U/L (ref 35–144)
ALT SERPL-CCNC: 17 U/L (ref 9–46)
ANION GAP SERPL CALCULATED.4IONS-SCNC: 8 MMOL/L (CALC) (ref 7–17)
AST SERPL-CCNC: 16 U/L (ref 10–35)
BILIRUB SERPL-MCNC: 0.3 MG/DL (ref 0.2–1.2)
BUN SERPL-MCNC: 11 MG/DL (ref 7–25)
CALCIUM SERPL-MCNC: 9.3 MG/DL (ref 8.6–10.3)
CHLORIDE SERPL-SCNC: 106 MMOL/L (ref 98–110)
CHOLEST SERPL-MCNC: 178 MG/DL
CHOLEST/HDLC SERPL: 4.7 (CALC)
CO2 SERPL-SCNC: 27 MMOL/L (ref 20–32)
CREAT SERPL-MCNC: 1.12 MG/DL (ref 0.7–1.28)
CREAT UR-MCNC: 175 MG/DL (ref 20–320)
EGFRCR SERPLBLD CKD-EPI 2021: 68 ML/MIN/1.73M2
ERYTHROCYTE [DISTWIDTH] IN BLOOD BY AUTOMATED COUNT: 13.7 % (ref 11–15)
GLUCOSE SERPL-MCNC: 102 MG/DL (ref 65–139)
HCT VFR BLD AUTO: 44.5 % (ref 38.5–50)
HCV AB SERPL QL IA: NORMAL
HDLC SERPL-MCNC: 38 MG/DL
HGB BLD-MCNC: 14.5 G/DL (ref 13.2–17.1)
LDLC SERPL CALC-MCNC: 119 MG/DL (CALC)
MCH RBC QN AUTO: 29.4 PG (ref 27–33)
MCHC RBC AUTO-ENTMCNC: 32.6 G/DL (ref 32–36)
MCV RBC AUTO: 90.3 FL (ref 80–100)
MICROALBUMIN UR-MCNC: 1.1 MG/DL
NONHDLC SERPL-MCNC: 140 MG/DL (CALC)
PLATELET # BLD AUTO: 202 THOUSAND/UL (ref 140–400)
PMV BLD REES-ECKER: 11.3 FL (ref 7.5–12.5)
POTASSIUM SERPL-SCNC: 4.6 MMOL/L (ref 3.5–5.3)
PROT SERPL-MCNC: 6.6 G/DL (ref 6.1–8.1)
PSA SERPL-MCNC: 0.9 NG/ML
RBC # BLD AUTO: 4.93 MILLION/UL (ref 4.2–5.8)
SODIUM SERPL-SCNC: 141 MMOL/L (ref 135–146)
TRIGL SERPL-MCNC: 100 MG/DL
WBC # BLD AUTO: 6.9 THOUSAND/UL (ref 3.8–10.8)

## 2025-03-31 NOTE — PROGRESS NOTES
Subjective   Claude E Yelder is a 74 y.o. male with a history of erectile dysfunction and BPH s/p GreenLight PVP on 9/27/2021. Patient presents today for a follow up visit.     No bothersome daytime symptoms, nocturia x3.           Past Medical History:   Diagnosis Date    Personal history of other diseases of the musculoskeletal system and connective tissue 07/31/2018    History of radiculopathy     No past surgical history on file.  No family history on file.  Current Outpatient Medications   Medication Sig Dispense Refill    FLUoxetine (PROzac) 20 mg capsule Take 1 capsule (20 mg) by mouth once daily. 90 capsule 1    gabapentin (Neurontin) 300 mg capsule Take 1 capsule (300 mg) by mouth 2 times a day. 60 capsule 2    hydroCHLOROthiazide (HYDRODiuril) 25 mg tablet Take 1 tablet (25 mg) by mouth once daily. 90 tablet 1    methocarbamol (Robaxin) 500 mg tablet Take 1 tablet (500 mg) by mouth 3 times a day as needed for muscle spasms for up to 7 days. 21 tablet 0    naproxen sodium (Aleve) 220 mg tablet Take 1 tablet (220 mg) by mouth every 12 hours.      sildenafil (Viagra) 100 mg tablet Take 1 tablet (100 mg) by mouth if needed for erectile dysfunction. 30 tablet 6     No current facility-administered medications for this visit.     No Known Allergies  Social History     Socioeconomic History    Marital status: Single     Spouse name: Not on file    Number of children: Not on file    Years of education: Not on file    Highest education level: Not on file   Occupational History    Not on file   Tobacco Use    Smoking status: Former     Types: Cigarettes    Smokeless tobacco: Never   Substance and Sexual Activity    Alcohol use: Not on file    Drug use: Not on file    Sexual activity: Not on file   Other Topics Concern    Not on file   Social History Narrative    Not on file     Social Drivers of Health     Financial Resource Strain: Not on file   Food Insecurity: Not on file   Transportation Needs: Not on file    Physical Activity: Not on file   Stress: Not on file   Social Connections: Not on file   Intimate Partner Violence: Not on file   Housing Stability: Not on file       Review of Systems  Pertinent items are noted in HPI.    Objective       Lab Review  Lab Results   Component Value Date    WBC 6.9 02/25/2025    RBC 4.93 02/25/2025    HGB 14.5 02/25/2025    HCT 44.5 02/25/2025     02/25/2025      Lab Results   Component Value Date    BUN 11 02/25/2025    CREATININE 1.12 02/25/2025      Lab Results   Component Value Date    PSA 0.90 02/25/2025     PVR: 0   Uroflow study demonstrated voided volume of 30cc   IPSS: 18 and 3         Assessment/Plan   Diagnoses and all orders for this visit:  BPH with obstruction/lower urinary tract symptoms  -     Measure post void residual  -     Urine flow measurement  Erectile dysfunction, unspecified erectile dysfunction type  -     sildenafil (Viagra) 100 mg tablet; Take 1 tablet (100 mg) by mouth if needed for erectile dysfunction.  Need for assessment for sleep apnea  -     Referral to Adult Sleep Medicine; Future      BPH s/p GreenLight PVP on 9/27/2021  Patient is doing well at this time. Continues to have nocturia 3x a night. Patient has not been evaluated for sleep apnea so we will send a referral to adult sleep medicine for further testing.       ED  Patient is doing well with sildenafil. Refill written for patient today.     All questions were answered to the patient's satisfaction. Patient agrees with the plan and wishes to proceed. Follow-up will be scheduled appropriately.     E&M visit today is associated with current or anticipated ongoing medical care services related to a patient's single, serious condition or a complex condition.      Scribed for Dr. Way by Melissa Braun. I , Dr Way, have personally reviewed and agreed with the information entered by the Virtual Scribe.

## 2025-04-01 ENCOUNTER — APPOINTMENT (OUTPATIENT)
Dept: UROLOGY | Facility: CLINIC | Age: 75
End: 2025-04-01
Payer: MEDICARE

## 2025-04-01 VITALS — BODY MASS INDEX: 24.13 KG/M2 | TEMPERATURE: 97.1 F | HEIGHT: 71 IN

## 2025-04-01 DIAGNOSIS — N13.8 BPH WITH OBSTRUCTION/LOWER URINARY TRACT SYMPTOMS: Primary | ICD-10-CM

## 2025-04-01 DIAGNOSIS — Z01.89 NEED FOR ASSESSMENT FOR SLEEP APNEA: ICD-10-CM

## 2025-04-01 DIAGNOSIS — N52.9 ERECTILE DYSFUNCTION, UNSPECIFIED ERECTILE DYSFUNCTION TYPE: ICD-10-CM

## 2025-04-01 DIAGNOSIS — N40.1 BPH WITH OBSTRUCTION/LOWER URINARY TRACT SYMPTOMS: Primary | ICD-10-CM

## 2025-04-01 PROCEDURE — G2211 COMPLEX E/M VISIT ADD ON: HCPCS | Performed by: UROLOGY

## 2025-04-01 PROCEDURE — 1123F ACP DISCUSS/DSCN MKR DOCD: CPT | Performed by: UROLOGY

## 2025-04-01 PROCEDURE — 51741 ELECTRO-UROFLOWMETRY FIRST: CPT | Performed by: UROLOGY

## 2025-04-01 PROCEDURE — 1126F AMNT PAIN NOTED NONE PRSNT: CPT | Performed by: UROLOGY

## 2025-04-01 PROCEDURE — 1036F TOBACCO NON-USER: CPT | Performed by: UROLOGY

## 2025-04-01 PROCEDURE — 1159F MED LIST DOCD IN RCRD: CPT | Performed by: UROLOGY

## 2025-04-01 PROCEDURE — 99214 OFFICE O/P EST MOD 30 MIN: CPT | Performed by: UROLOGY

## 2025-04-01 PROCEDURE — 51798 US URINE CAPACITY MEASURE: CPT | Performed by: UROLOGY

## 2025-04-01 RX ORDER — SILDENAFIL 100 MG/1
100 TABLET, FILM COATED ORAL AS NEEDED
Qty: 30 TABLET | Refills: 6 | Status: SHIPPED | OUTPATIENT
Start: 2025-04-01 | End: 2026-04-01

## 2025-04-01 ASSESSMENT — PAIN SCALES - GENERAL: PAINLEVEL_OUTOF10: 0-NO PAIN

## 2025-09-05 ENCOUNTER — OFFICE VISIT (OUTPATIENT)
Dept: PRIMARY CARE | Facility: CLINIC | Age: 75
End: 2025-09-05
Payer: MEDICARE

## 2025-09-05 VITALS
SYSTOLIC BLOOD PRESSURE: 131 MMHG | WEIGHT: 176 LBS | HEART RATE: 83 BPM | DIASTOLIC BLOOD PRESSURE: 77 MMHG | BODY MASS INDEX: 24.55 KG/M2 | OXYGEN SATURATION: 96 %

## 2025-09-05 DIAGNOSIS — M54.31 RIGHT SIDED SCIATICA: ICD-10-CM

## 2025-09-05 DIAGNOSIS — R42 ORTHOSTATIC DIZZINESS: ICD-10-CM

## 2025-09-05 DIAGNOSIS — I15.1 HYPERTENSION SECONDARY TO OTHER RENAL DISORDERS: ICD-10-CM

## 2025-09-05 DIAGNOSIS — R42 DIZZINESS: Primary | ICD-10-CM

## 2025-09-05 DIAGNOSIS — I10 IDIOPATHIC HYPERTENSION: ICD-10-CM

## 2025-09-05 RX ORDER — HYDROCHLOROTHIAZIDE 25 MG/1
12.5 TABLET ORAL DAILY
Qty: 90 TABLET | Refills: 1 | Status: SHIPPED | OUTPATIENT
Start: 2025-09-05 | End: 2026-08-31

## 2025-09-05 ASSESSMENT — ENCOUNTER SYMPTOMS: DIZZINESS: 1

## 2026-04-01 ENCOUNTER — APPOINTMENT (OUTPATIENT)
Dept: UROLOGY | Facility: CLINIC | Age: 76
End: 2026-04-01
Payer: MEDICARE